# Patient Record
Sex: FEMALE | Race: OTHER | HISPANIC OR LATINO | URBAN - METROPOLITAN AREA
[De-identification: names, ages, dates, MRNs, and addresses within clinical notes are randomized per-mention and may not be internally consistent; named-entity substitution may affect disease eponyms.]

---

## 2019-06-17 ENCOUNTER — EMERGENCY (EMERGENCY)
Facility: HOSPITAL | Age: 29
LOS: 1 days | Discharge: HOME | End: 2019-06-17
Attending: EMERGENCY MEDICINE | Admitting: EMERGENCY MEDICINE
Payer: COMMERCIAL

## 2019-06-17 VITALS
SYSTOLIC BLOOD PRESSURE: 107 MMHG | RESPIRATION RATE: 16 BRPM | TEMPERATURE: 101 F | OXYGEN SATURATION: 99 % | DIASTOLIC BLOOD PRESSURE: 60 MMHG | HEART RATE: 67 BPM

## 2019-06-17 VITALS
OXYGEN SATURATION: 98 % | SYSTOLIC BLOOD PRESSURE: 129 MMHG | RESPIRATION RATE: 18 BRPM | DIASTOLIC BLOOD PRESSURE: 69 MMHG | TEMPERATURE: 98 F | HEART RATE: 100 BPM

## 2019-06-17 DIAGNOSIS — K62.5 HEMORRHAGE OF ANUS AND RECTUM: ICD-10-CM

## 2019-06-17 DIAGNOSIS — Z91.013 ALLERGY TO SEAFOOD: ICD-10-CM

## 2019-06-17 LAB
ALBUMIN SERPL ELPH-MCNC: 3.9 G/DL — SIGNIFICANT CHANGE UP (ref 3.3–5)
ALP SERPL-CCNC: 63 U/L — SIGNIFICANT CHANGE UP (ref 40–120)
ALT FLD-CCNC: 19 U/L — SIGNIFICANT CHANGE UP (ref 10–45)
ANION GAP SERPL CALC-SCNC: 12 MMOL/L — SIGNIFICANT CHANGE UP (ref 5–17)
APPEARANCE UR: CLEAR — SIGNIFICANT CHANGE UP
APTT BLD: 27.3 SEC — LOW (ref 27.5–36.3)
AST SERPL-CCNC: 20 U/L — SIGNIFICANT CHANGE UP (ref 10–40)
BASOPHILS # BLD AUTO: 0.12 K/UL — SIGNIFICANT CHANGE UP (ref 0–0.2)
BASOPHILS NFR BLD AUTO: 0.9 % — SIGNIFICANT CHANGE UP (ref 0–2)
BILIRUB SERPL-MCNC: 0.3 MG/DL — SIGNIFICANT CHANGE UP (ref 0.2–1.2)
BILIRUB UR-MCNC: NEGATIVE — SIGNIFICANT CHANGE UP
BUN SERPL-MCNC: 10 MG/DL — SIGNIFICANT CHANGE UP (ref 7–23)
CALCIUM SERPL-MCNC: 8.7 MG/DL — SIGNIFICANT CHANGE UP (ref 8.4–10.5)
CHLORIDE SERPL-SCNC: 102 MMOL/L — SIGNIFICANT CHANGE UP (ref 96–108)
CO2 SERPL-SCNC: 23 MMOL/L — SIGNIFICANT CHANGE UP (ref 22–31)
COLOR SPEC: YELLOW — SIGNIFICANT CHANGE UP
CREAT SERPL-MCNC: 0.78 MG/DL — SIGNIFICANT CHANGE UP (ref 0.5–1.3)
CULTURE RESULTS: SIGNIFICANT CHANGE UP
DIFF PNL FLD: NEGATIVE — SIGNIFICANT CHANGE UP
EOSINOPHIL # BLD AUTO: 0 K/UL — SIGNIFICANT CHANGE UP (ref 0–0.5)
EOSINOPHIL NFR BLD AUTO: 0 % — SIGNIFICANT CHANGE UP (ref 0–6)
GLUCOSE SERPL-MCNC: 119 MG/DL — HIGH (ref 70–99)
GLUCOSE UR QL: NEGATIVE — SIGNIFICANT CHANGE UP
HCT VFR BLD CALC: 39.3 % — SIGNIFICANT CHANGE UP (ref 34.5–45)
HGB BLD-MCNC: 12.8 G/DL — SIGNIFICANT CHANGE UP (ref 11.5–15.5)
INR BLD: 1.16 — SIGNIFICANT CHANGE UP (ref 0.88–1.16)
KETONES UR-MCNC: ABNORMAL MG/DL
LACTATE SERPL-SCNC: 1.1 MMOL/L — SIGNIFICANT CHANGE UP (ref 0.5–2)
LEUKOCYTE ESTERASE UR-ACNC: NEGATIVE — SIGNIFICANT CHANGE UP
LIDOCAIN IGE QN: 15 U/L — SIGNIFICANT CHANGE UP (ref 7–60)
LYMPHOCYTES # BLD AUTO: 0.81 K/UL — LOW (ref 1–3.3)
LYMPHOCYTES # BLD AUTO: 6.1 % — LOW (ref 13–44)
MCHC RBC-ENTMCNC: 28.9 PG — SIGNIFICANT CHANGE UP (ref 27–34)
MCHC RBC-ENTMCNC: 32.6 GM/DL — SIGNIFICANT CHANGE UP (ref 32–36)
MCV RBC AUTO: 88.7 FL — SIGNIFICANT CHANGE UP (ref 80–100)
MONOCYTES # BLD AUTO: 0.47 K/UL — SIGNIFICANT CHANGE UP (ref 0–0.9)
MONOCYTES NFR BLD AUTO: 3.5 % — SIGNIFICANT CHANGE UP (ref 2–14)
NEUTROPHILS # BLD AUTO: 11.92 K/UL — HIGH (ref 1.8–7.4)
NEUTROPHILS NFR BLD AUTO: 80.8 % — HIGH (ref 43–77)
NITRITE UR-MCNC: NEGATIVE — SIGNIFICANT CHANGE UP
PH UR: 6 — SIGNIFICANT CHANGE UP (ref 5–8)
PLATELET # BLD AUTO: 217 K/UL — SIGNIFICANT CHANGE UP (ref 150–400)
POTASSIUM SERPL-MCNC: 3.6 MMOL/L — SIGNIFICANT CHANGE UP (ref 3.5–5.3)
POTASSIUM SERPL-SCNC: 3.6 MMOL/L — SIGNIFICANT CHANGE UP (ref 3.5–5.3)
PROT SERPL-MCNC: 7.1 G/DL — SIGNIFICANT CHANGE UP (ref 6–8.3)
PROT UR-MCNC: NEGATIVE MG/DL — SIGNIFICANT CHANGE UP
PROTHROM AB SERPL-ACNC: 13.2 SEC — HIGH (ref 10–12.9)
RBC # BLD: 4.43 M/UL — SIGNIFICANT CHANGE UP (ref 3.8–5.2)
RBC # FLD: 11.6 % — SIGNIFICANT CHANGE UP (ref 10.3–14.5)
SODIUM SERPL-SCNC: 137 MMOL/L — SIGNIFICANT CHANGE UP (ref 135–145)
SP GR SPEC: <=1.005 — SIGNIFICANT CHANGE UP (ref 1–1.03)
SPECIMEN SOURCE: SIGNIFICANT CHANGE UP
UROBILINOGEN FLD QL: 0.2 E.U./DL — SIGNIFICANT CHANGE UP
WBC # BLD: 13.32 K/UL — HIGH (ref 3.8–10.5)
WBC # FLD AUTO: 13.32 K/UL — HIGH (ref 3.8–10.5)

## 2019-06-17 PROCEDURE — 74177 CT ABD & PELVIS W/CONTRAST: CPT | Mod: 26

## 2019-06-17 PROCEDURE — 74177 CT ABD & PELVIS W/CONTRAST: CPT

## 2019-06-17 PROCEDURE — 93010 ELECTROCARDIOGRAM REPORT: CPT

## 2019-06-17 PROCEDURE — 87507 IADNA-DNA/RNA PROBE TQ 12-25: CPT

## 2019-06-17 PROCEDURE — 85610 PROTHROMBIN TIME: CPT

## 2019-06-17 PROCEDURE — 81003 URINALYSIS AUTO W/O SCOPE: CPT

## 2019-06-17 PROCEDURE — 99285 EMERGENCY DEPT VISIT HI MDM: CPT | Mod: 25

## 2019-06-17 PROCEDURE — 99284 EMERGENCY DEPT VISIT MOD MDM: CPT | Mod: 25

## 2019-06-17 PROCEDURE — 96375 TX/PRO/DX INJ NEW DRUG ADDON: CPT

## 2019-06-17 PROCEDURE — 93005 ELECTROCARDIOGRAM TRACING: CPT

## 2019-06-17 PROCEDURE — 80053 COMPREHEN METABOLIC PANEL: CPT

## 2019-06-17 PROCEDURE — 85025 COMPLETE CBC W/AUTO DIFF WBC: CPT

## 2019-06-17 PROCEDURE — 36415 COLL VENOUS BLD VENIPUNCTURE: CPT

## 2019-06-17 PROCEDURE — 83605 ASSAY OF LACTIC ACID: CPT

## 2019-06-17 PROCEDURE — 83690 ASSAY OF LIPASE: CPT

## 2019-06-17 PROCEDURE — 96374 THER/PROPH/DIAG INJ IV PUSH: CPT | Mod: XU

## 2019-06-17 PROCEDURE — 85730 THROMBOPLASTIN TIME PARTIAL: CPT

## 2019-06-17 RX ORDER — MORPHINE SULFATE 50 MG/1
2 CAPSULE, EXTENDED RELEASE ORAL ONCE
Refills: 0 | Status: DISCONTINUED | OUTPATIENT
Start: 2019-06-17 | End: 2019-06-17

## 2019-06-17 RX ORDER — SODIUM CHLORIDE 9 MG/ML
1000 INJECTION INTRAMUSCULAR; INTRAVENOUS; SUBCUTANEOUS ONCE
Refills: 0 | Status: COMPLETED | OUTPATIENT
Start: 2019-06-17 | End: 2019-06-17

## 2019-06-17 RX ORDER — AZITHROMYCIN 500 MG/1
1 TABLET, FILM COATED ORAL
Qty: 2 | Refills: 0
Start: 2019-06-17 | End: 2019-06-18

## 2019-06-17 RX ORDER — ONDANSETRON 8 MG/1
4 TABLET, FILM COATED ORAL ONCE
Refills: 0 | Status: COMPLETED | OUTPATIENT
Start: 2019-06-17 | End: 2019-06-17

## 2019-06-17 RX ORDER — ACETAMINOPHEN 500 MG
1000 TABLET ORAL ONCE
Refills: 0 | Status: COMPLETED | OUTPATIENT
Start: 2019-06-17 | End: 2019-06-17

## 2019-06-17 RX ORDER — AZITHROMYCIN 500 MG/1
500 TABLET, FILM COATED ORAL ONCE
Refills: 0 | Status: COMPLETED | OUTPATIENT
Start: 2019-06-17 | End: 2019-06-17

## 2019-06-17 RX ORDER — IOHEXOL 300 MG/ML
30 INJECTION, SOLUTION INTRAVENOUS ONCE
Refills: 0 | Status: COMPLETED | OUTPATIENT
Start: 2019-06-17 | End: 2019-06-17

## 2019-06-17 RX ADMIN — AZITHROMYCIN 500 MILLIGRAM(S): 500 TABLET, FILM COATED ORAL at 06:21

## 2019-06-17 RX ADMIN — Medication 1000 MILLIGRAM(S): at 06:21

## 2019-06-17 RX ADMIN — IOHEXOL 30 MILLILITER(S): 300 INJECTION, SOLUTION INTRAVENOUS at 01:56

## 2019-06-17 RX ADMIN — ONDANSETRON 4 MILLIGRAM(S): 8 TABLET, FILM COATED ORAL at 01:56

## 2019-06-17 RX ADMIN — MORPHINE SULFATE 2 MILLIGRAM(S): 50 CAPSULE, EXTENDED RELEASE ORAL at 01:56

## 2019-06-17 RX ADMIN — SODIUM CHLORIDE 1000 MILLILITER(S): 9 INJECTION INTRAMUSCULAR; INTRAVENOUS; SUBCUTANEOUS at 01:56

## 2019-06-17 NOTE — ED PROVIDER NOTE - PHYSICAL EXAMINATION
VITAL SIGNS: I have reviewed nursing notes and confirm.  CONSTITUTIONAL: Well-developed; well-nourished; in no acute distress.  SKIN: warm and dry, no acute rash.  HEAD: Normocephalic; atraumatic.  EYES: PERRL, EOM intact; conjunctiva and sclera clear.  ENT: No nasal discharge; airway clear.  NECK: Supple; non tender.  CARD: S1, S2 normal; no murmurs, gallops, or rubs. Regular rate and rhythm.  RESP: No wheezes, rales or rhonchi.  ABD: Normal bowel sounds; soft; non-distended; b/l lower abd TTP w/out rigidity/rebound/guarding  : No CVA TTP b/l  RECTAL: No stool in vault, no hemorrhoids, + small amount blood tinged mucus in vault  EXT: Normal ROM. No clubbing, cyanosis or edema.  NEURO: Alert, oriented. Grossly unremarkable.  PSYCH: Cooperative, appropriate.

## 2019-06-17 NOTE — ED PROVIDER NOTE - CLINICAL SUMMARY MEDICAL DECISION MAKING FREE TEXT BOX
Syncope w/recent heavy vaginal bleeding (miscarriage) and now BRBPR -- possible symptomatic anemia, r/o cardiac etiology. Given abd TTP and n/v/d, will obtain CT of abd/pel, pain control, supportive care, and reassess.

## 2019-06-17 NOTE — ED PROVIDER NOTE - OBJECTIVE STATEMENT
29 y/o F w/no sig pmhx, had miscarriage 10 days ago (was approx 2-3wks pregnant at the time) w/resolution of vaginal bleeding, developed nausea and crampy abd pain today, vomited several times nbnb emesis, had an episode of watery diarrhea that then converted into bloody diarrhea w/bright red blood. Also in the afternoon pt was walking in her apt, became lightheaded and collapsed against the wall, having first a near syncopal episode then full syncopal episode minutes later confirmed by boyfriend who was on scene caring for pt. No loss of bowel/bladder function, rhythmic jerking, or tongue biting. Pt woke up several seconds later w/no confusion. No fever/chills. No new or suspicious foods. No hx of GI bleed, hemorrhoids, or syncope. No SOB, CP, or palpitations.

## 2019-06-17 NOTE — ED ADULT NURSE NOTE - OBJECTIVE STATEMENT
pt. presents with c/o fever, n/v, abdominal cramping and bloody diarrhea, pt. also c/o of a near syncopal and syncopal episode after other symptoms started. pt. reports hx of miscarriage 10 days ago, no vaginal bleed at present time. pt. is A&Ox3, communicates w/o difficulty, skin warm dry, no c/o chest pain, palpitations, dizziness.

## 2019-12-10 ENCOUNTER — APPOINTMENT (OUTPATIENT)
Dept: OBGYN | Facility: CLINIC | Age: 29
End: 2019-12-10

## 2020-01-03 ENCOUNTER — EMERGENCY (EMERGENCY)
Facility: HOSPITAL | Age: 30
LOS: 1 days | Discharge: HOME | End: 2020-01-03
Attending: EMERGENCY MEDICINE | Admitting: EMERGENCY MEDICINE
Payer: COMMERCIAL

## 2020-01-03 VITALS
TEMPERATURE: 100 F | OXYGEN SATURATION: 98 % | HEART RATE: 104 BPM | DIASTOLIC BLOOD PRESSURE: 69 MMHG | RESPIRATION RATE: 18 BRPM | SYSTOLIC BLOOD PRESSURE: 100 MMHG | HEIGHT: 61 IN | WEIGHT: 141.1 LBS

## 2020-01-03 VITALS
OXYGEN SATURATION: 100 % | RESPIRATION RATE: 18 BRPM | HEART RATE: 96 BPM | TEMPERATURE: 99 F | DIASTOLIC BLOOD PRESSURE: 75 MMHG | SYSTOLIC BLOOD PRESSURE: 113 MMHG

## 2020-01-03 LAB
ALBUMIN SERPL ELPH-MCNC: 4.2 G/DL — SIGNIFICANT CHANGE UP (ref 3.3–5)
ALP SERPL-CCNC: 53 U/L — SIGNIFICANT CHANGE UP (ref 40–120)
ALT FLD-CCNC: 16 U/L — SIGNIFICANT CHANGE UP (ref 10–45)
ANION GAP SERPL CALC-SCNC: 15 MMOL/L — SIGNIFICANT CHANGE UP (ref 5–17)
APPEARANCE UR: CLEAR — SIGNIFICANT CHANGE UP
AST SERPL-CCNC: 17 U/L — SIGNIFICANT CHANGE UP (ref 10–40)
BASOPHILS # BLD AUTO: 0.06 K/UL — SIGNIFICANT CHANGE UP (ref 0–0.2)
BASOPHILS NFR BLD AUTO: 0.9 % — SIGNIFICANT CHANGE UP (ref 0–2)
BILIRUB SERPL-MCNC: 0.3 MG/DL — SIGNIFICANT CHANGE UP (ref 0.2–1.2)
BILIRUB UR-MCNC: NEGATIVE — SIGNIFICANT CHANGE UP
BUN SERPL-MCNC: 7 MG/DL — SIGNIFICANT CHANGE UP (ref 7–23)
CALCIUM SERPL-MCNC: 9.3 MG/DL — SIGNIFICANT CHANGE UP (ref 8.4–10.5)
CHLORIDE SERPL-SCNC: 101 MMOL/L — SIGNIFICANT CHANGE UP (ref 96–108)
CO2 SERPL-SCNC: 19 MMOL/L — LOW (ref 22–31)
COLOR SPEC: YELLOW — SIGNIFICANT CHANGE UP
CREAT SERPL-MCNC: 0.5 MG/DL — SIGNIFICANT CHANGE UP (ref 0.5–1.3)
DIFF PNL FLD: NEGATIVE — SIGNIFICANT CHANGE UP
EOSINOPHIL # BLD AUTO: 0.18 K/UL — SIGNIFICANT CHANGE UP (ref 0–0.5)
EOSINOPHIL NFR BLD AUTO: 2.6 % — SIGNIFICANT CHANGE UP (ref 0–6)
FLU A RESULT: SIGNIFICANT CHANGE UP
FLU A RESULT: SIGNIFICANT CHANGE UP
FLUAV AG NPH QL: SIGNIFICANT CHANGE UP
FLUBV AG NPH QL: SIGNIFICANT CHANGE UP
GLUCOSE SERPL-MCNC: 91 MG/DL — SIGNIFICANT CHANGE UP (ref 70–99)
GLUCOSE UR QL: NEGATIVE — SIGNIFICANT CHANGE UP
HCG SERPL-ACNC: HIGH MIU/ML
HCT VFR BLD CALC: 38.7 % — SIGNIFICANT CHANGE UP (ref 34.5–45)
HGB BLD-MCNC: 13.1 G/DL — SIGNIFICANT CHANGE UP (ref 11.5–15.5)
KETONES UR-MCNC: >=80 MG/DL
LEUKOCYTE ESTERASE UR-ACNC: ABNORMAL
LYMPHOCYTES # BLD AUTO: 0.3 K/UL — LOW (ref 1–3.3)
LYMPHOCYTES # BLD AUTO: 4.4 % — LOW (ref 13–44)
MCHC RBC-ENTMCNC: 29.5 PG — SIGNIFICANT CHANGE UP (ref 27–34)
MCHC RBC-ENTMCNC: 33.9 GM/DL — SIGNIFICANT CHANGE UP (ref 32–36)
MCV RBC AUTO: 87.2 FL — SIGNIFICANT CHANGE UP (ref 80–100)
MONOCYTES # BLD AUTO: 0.18 K/UL — SIGNIFICANT CHANGE UP (ref 0–0.9)
MONOCYTES NFR BLD AUTO: 2.6 % — SIGNIFICANT CHANGE UP (ref 2–14)
NEUTROPHILS # BLD AUTO: 6.18 K/UL — SIGNIFICANT CHANGE UP (ref 1.8–7.4)
NEUTROPHILS NFR BLD AUTO: 86.9 % — HIGH (ref 43–77)
NITRITE UR-MCNC: NEGATIVE — SIGNIFICANT CHANGE UP
PH UR: 6.5 — SIGNIFICANT CHANGE UP (ref 5–8)
PLATELET # BLD AUTO: 197 K/UL — SIGNIFICANT CHANGE UP (ref 150–400)
POTASSIUM SERPL-MCNC: 3.7 MMOL/L — SIGNIFICANT CHANGE UP (ref 3.5–5.3)
POTASSIUM SERPL-SCNC: 3.7 MMOL/L — SIGNIFICANT CHANGE UP (ref 3.5–5.3)
PROT SERPL-MCNC: 7.3 G/DL — SIGNIFICANT CHANGE UP (ref 6–8.3)
PROT UR-MCNC: ABNORMAL MG/DL
RBC # BLD: 4.44 M/UL — SIGNIFICANT CHANGE UP (ref 3.8–5.2)
RBC # FLD: 11.9 % — SIGNIFICANT CHANGE UP (ref 10.3–14.5)
RSV RESULT: SIGNIFICANT CHANGE UP
RSV RNA RESP QL NAA+PROBE: SIGNIFICANT CHANGE UP
SODIUM SERPL-SCNC: 135 MMOL/L — SIGNIFICANT CHANGE UP (ref 135–145)
SP GR SPEC: 1.02 — SIGNIFICANT CHANGE UP (ref 1–1.03)
UROBILINOGEN FLD QL: 0.2 E.U./DL — SIGNIFICANT CHANGE UP
WBC # BLD: 6.91 K/UL — SIGNIFICANT CHANGE UP (ref 3.8–10.5)
WBC # FLD AUTO: 6.91 K/UL — SIGNIFICANT CHANGE UP (ref 3.8–10.5)

## 2020-01-03 PROCEDURE — 76801 OB US < 14 WKS SINGLE FETUS: CPT

## 2020-01-03 PROCEDURE — 81001 URINALYSIS AUTO W/SCOPE: CPT

## 2020-01-03 PROCEDURE — 76817 TRANSVAGINAL US OBSTETRIC: CPT | Mod: 26

## 2020-01-03 PROCEDURE — 99284 EMERGENCY DEPT VISIT MOD MDM: CPT

## 2020-01-03 PROCEDURE — 76817 TRANSVAGINAL US OBSTETRIC: CPT

## 2020-01-03 PROCEDURE — 96374 THER/PROPH/DIAG INJ IV PUSH: CPT

## 2020-01-03 PROCEDURE — 80053 COMPREHEN METABOLIC PANEL: CPT

## 2020-01-03 PROCEDURE — 84702 CHORIONIC GONADOTROPIN TEST: CPT

## 2020-01-03 PROCEDURE — 99284 EMERGENCY DEPT VISIT MOD MDM: CPT | Mod: 25

## 2020-01-03 PROCEDURE — 87086 URINE CULTURE/COLONY COUNT: CPT

## 2020-01-03 PROCEDURE — 76801 OB US < 14 WKS SINGLE FETUS: CPT | Mod: 26

## 2020-01-03 PROCEDURE — 85025 COMPLETE CBC W/AUTO DIFF WBC: CPT

## 2020-01-03 PROCEDURE — 87631 RESP VIRUS 3-5 TARGETS: CPT

## 2020-01-03 PROCEDURE — 36415 COLL VENOUS BLD VENIPUNCTURE: CPT

## 2020-01-03 RX ORDER — SODIUM CHLORIDE 9 MG/ML
1000 INJECTION INTRAMUSCULAR; INTRAVENOUS; SUBCUTANEOUS ONCE
Refills: 0 | Status: COMPLETED | OUTPATIENT
Start: 2020-01-03 | End: 2020-01-03

## 2020-01-03 RX ORDER — METOCLOPRAMIDE HCL 10 MG
10 TABLET ORAL ONCE
Refills: 0 | Status: COMPLETED | OUTPATIENT
Start: 2020-01-03 | End: 2020-01-03

## 2020-01-03 RX ORDER — ACETAMINOPHEN 500 MG
650 TABLET ORAL ONCE
Refills: 0 | Status: COMPLETED | OUTPATIENT
Start: 2020-01-03 | End: 2020-01-03

## 2020-01-03 RX ADMIN — Medication 650 MILLIGRAM(S): at 13:05

## 2020-01-03 RX ADMIN — Medication 104 MILLIGRAM(S): at 13:04

## 2020-01-03 RX ADMIN — SODIUM CHLORIDE 1000 MILLILITER(S): 9 INJECTION INTRAMUSCULAR; INTRAVENOUS; SUBCUTANEOUS at 13:04

## 2020-01-03 NOTE — ED PROVIDER NOTE - PHYSICAL EXAMINATION
CONSTITUTIONAL: Well-appearing; well-nourished; in no apparent distress.   HEAD: Normocephalic; atraumatic.   EYES: PERRL; EOM intact; conjunctiva and sclera clear  ENT: normal nose; no rhinorrhea; normal pharynx with no erythema or lesions.   NECK: Supple; non-tender; no LAD  CARDIOVASCULAR: Normal S1, S2; no murmurs, rubs, or gallops. Regular rate and rhythm.   RESPIRATORY: Breathing easily; breath sounds clear and equal bilaterally; no wheezes, rhonchi, or rales.  GI: Mild suprapubic tenderness to palpation. Abdomen is otherwise soft; non-distended; no palpable organomegaly.   : No CMT or adnexal tenderness  MSK: FROM at all extremities, normal tone   EXT: No cyanosis or edema; N/V intact  SKIN: Normal for age and race; warm; dry; good turgor; no apparent lesions or rash.   NEURO: A & O x 3; face symmetric; grossly unremarkable.   PSYCHOLOGICAL: The patient’s mood and manner are appropriate.

## 2020-01-03 NOTE — ED PROVIDER NOTE - ATTENDING CONTRIBUTION TO CARE
30 yo F  at 13 weeks p/w fever,  nausea vomiting, generalized ab pain and cough x 3 days.  No diarrhea.  Pt Tm 100.2 at home.  No chest pain sob, vaginal bleeding, fluid loss.  99.6 oral HR 90s.  Belly soft nontender, lungs clear nl heent no meningeal signs.  Labs and flu wnl.  Pt hydrated and tx and improved.  Likely viral.  Do not suspect pna, sepsis or  labor. Plan outpt fu with PCP and GYN.

## 2020-01-03 NOTE — ED PROVIDER NOTE - DISPOSITION TYPE
[place] [facility]  [address] [address]  [phone]      August 13, 2018    RE:  Josefina Aldrich                                                                                                                                                       [id number]  [facility] [address]            To whom it may concern:    Josefina Aldrich is under my professional care for Medical.  She  may return to work with the following: No working or lifting restrictions on or about 8-15-18.          Sincerely,        Onur Serrano    [place] [facility]        
DISCHARGE

## 2020-01-03 NOTE — ED PROVIDER NOTE - PATIENT PORTAL LINK FT
You can access the FollowMyHealth Patient Portal offered by [facility] by registering at the following website: [email or web address]. By joining [employer]’s FollowMyHealth portal, you will also be able to view your health information using other applications (apps) compatible with our system.

## 2020-01-03 NOTE — ED ADULT NURSE NOTE - OBJECTIVE STATEMENT
Patient is a 28yo F, arrived to ED via walk-in, AAOx3, in NAD, VSS, complaining of abdominal pain, nausea and vomiting.  Patient also complaining of cough for three days.  Patient denies any fevers, vomiting, vaginal bleeding or any other complaints at this time.

## 2020-01-03 NOTE — ED PROVIDER NOTE - CLINICAL SUMMARY MEDICAL DECISION MAKING FREE TEXT BOX
28 y/o F  at 13 weeks gestation presents with generalized abdominal pain, nausea, and vomiting since this morning associated with low grade fever, cough and rhinorrhea; no body aches or vaginal bleeding. In the ED, temp 99.6F, . Symptoms likely from viral illness. Will check labs, ultrasound to check baby, and flu swab.

## 2020-01-03 NOTE — ED ADULT TRIAGE NOTE - OTHER COMPLAINTS
, 13 weeks pregnant, c/o nausea/vomiting, mid abdominal pain. denies vaginal bleeding. also reports cough and low grade temp 100.2F today.

## 2020-01-03 NOTE — ED ADULT NURSE NOTE - NSIMPLEMENTINTERV_GEN_ALL_ED
Implemented All Universal Safety Interventions:  [place] to call system. Call bell, personal items and telephone within reach. Instruct patient to call for assistance. Room bathroom lighting operational. Non-slip footwear when patient is off stretcher. Physically safe environment: no spills, clutter or unnecessary equipment. Stretcher in lowest position, wheels locked, appropriate side rails in place.

## 2020-01-03 NOTE — ED PROVIDER NOTE - CARE PROVIDER_API CALL
Jamel Rodriguez)  Obstetrics and Gynecology  [address] [place], [place] [address]  Phone: [phone]  Fax: [phone]  Follow Up Time:

## 2020-01-03 NOTE — ED PROVIDER NOTE - OBJECTIVE STATEMENT
28 y/o F, , currently 13 weeks pregnant, with no PMHx presents to the ED with generalized abdominal pain, nausea and vomiting since 6am this morning with headache, fever, cough and rhinorrhea. Denies pelvic pain, diarrhea, dysuria, hematuria, vaginal bleeding/discharge, myalgias, or sick contact. Pt has received this season's flu vaccine. Follows with Dr. Rodriguez (OB/GYN)

## 2020-01-04 LAB
CULTURE RESULTS: NO GROWTH — SIGNIFICANT CHANGE UP
SPECIMEN SOURCE: SIGNIFICANT CHANGE UP

## 2020-01-08 DIAGNOSIS — B34.9 VIRAL INFECTION, UNSPECIFIED: ICD-10-CM

## 2020-01-08 DIAGNOSIS — O26.891 OTHER SPECIFIED PREGNANCY RELATED CONDITIONS, FIRST TRIMESTER: ICD-10-CM

## 2020-01-08 DIAGNOSIS — Z3A.13 13 WEEKS GESTATION OF PREGNANCY: ICD-10-CM

## 2020-01-08 DIAGNOSIS — R10.84 GENERALIZED ABDOMINAL PAIN: ICD-10-CM

## 2020-03-03 PROBLEM — Z78.9 OTHER SPECIFIED HEALTH STATUS: Chronic | Status: ACTIVE | Noted: 2020-01-03

## 2020-03-13 ENCOUNTER — NON-APPOINTMENT (OUTPATIENT)
Age: 30
End: 2020-03-13

## 2020-03-13 ENCOUNTER — APPOINTMENT (OUTPATIENT)
Dept: OBGYN | Facility: CLINIC | Age: 30
End: 2020-03-13
Payer: COMMERCIAL

## 2020-03-13 VITALS — DIASTOLIC BLOOD PRESSURE: 60 MMHG | SYSTOLIC BLOOD PRESSURE: 100 MMHG | WEIGHT: 149.38 LBS

## 2020-03-13 PROCEDURE — 36415 COLL VENOUS BLD VENIPUNCTURE: CPT

## 2020-03-13 PROCEDURE — 99202 OFFICE O/P NEW SF 15 MIN: CPT

## 2020-03-15 LAB
ABO + RH PNL BLD: NORMAL
ALBUMIN SERPL ELPH-MCNC: 3.8 G/DL
ALP BLD-CCNC: 60 U/L
ALT SERPL-CCNC: 16 U/L
ANION GAP SERPL CALC-SCNC: 14 MMOL/L
AST SERPL-CCNC: 18 U/L
BASOPHILS # BLD AUTO: 0.03 K/UL
BASOPHILS NFR BLD AUTO: 0.3 %
BILIRUB SERPL-MCNC: 0.2 MG/DL
BLD GP AB SCN SERPL QL: NORMAL
BUN SERPL-MCNC: 10 MG/DL
CALCIUM SERPL-MCNC: 8.9 MG/DL
CHLORIDE SERPL-SCNC: 101 MMOL/L
CMV IGG SERPL QL: <0.2 U/ML
CMV IGG SERPL-IMP: NEGATIVE
CMV IGM SERPL QL: <8 AU/ML
CMV IGM SERPL QL: NEGATIVE
CO2 SERPL-SCNC: 21 MMOL/L
CREAT SERPL-MCNC: 0.59 MG/DL
EOSINOPHIL # BLD AUTO: 0.24 K/UL
EOSINOPHIL NFR BLD AUTO: 2.7 %
ESTIMATED AVERAGE GLUCOSE: 88 MG/DL
GLUCOSE SERPL-MCNC: 96 MG/DL
HBA1C MFR BLD HPLC: 4.7 %
HBV SURFACE AG SER QL: NONREACTIVE
HCT VFR BLD CALC: 35.1 %
HCV AB SER QL: NONREACTIVE
HCV S/CO RATIO: 0.29 S/CO
HGB BLD-MCNC: 11.1 G/DL
HIV1+2 AB SPEC QL IA.RAPID: NONREACTIVE
HSV 1+2 IGG SER IA-IMP: NEGATIVE
HSV 1+2 IGG SER IA-IMP: POSITIVE
HSV1 IGG SER QL: 8.99 INDEX
HSV2 IGG SER QL: 0.16 INDEX
IMM GRANULOCYTES NFR BLD AUTO: 0.3 %
LYMPHOCYTES # BLD AUTO: 1.17 K/UL
LYMPHOCYTES NFR BLD AUTO: 13.2 %
MAN DIFF?: NORMAL
MCHC RBC-ENTMCNC: 29.8 PG
MCHC RBC-ENTMCNC: 31.6 GM/DL
MCV RBC AUTO: 94.4 FL
MEV IGG FLD QL IA: 16.9 AU/ML
MEV IGG+IGM SER-IMP: POSITIVE
MONOCYTES # BLD AUTO: 0.38 K/UL
MONOCYTES NFR BLD AUTO: 4.3 %
NEUTROPHILS # BLD AUTO: 6.99 K/UL
NEUTROPHILS NFR BLD AUTO: 79.2 %
PLATELET # BLD AUTO: 214 K/UL
POTASSIUM SERPL-SCNC: 4.2 MMOL/L
PROT SERPL-MCNC: 6.2 G/DL
RBC # BLD: 3.72 M/UL
RBC # FLD: 13 %
RUBV IGG FLD-ACNC: 10.6 INDEX
RUBV IGG SER-IMP: POSITIVE
SODIUM SERPL-SCNC: 136 MMOL/L
T GONDII AB SER-IMP: NEGATIVE
T GONDII AB SER-IMP: NEGATIVE
T GONDII IGG SER QL: <3 IU/ML
T GONDII IGM SER QL: <3 AU/ML
T PALLIDUM AB SER QL IA: NEGATIVE
TSH SERPL-ACNC: 0.67 UIU/ML
VZV AB TITR SER: POSITIVE
VZV IGG SER IF-ACNC: 1186 INDEX
WBC # FLD AUTO: 8.84 K/UL

## 2020-03-15 NOTE — CHIEF COMPLAINT
[Initial Prenatal Visit] : initial prenatal visit [FreeTextEntry1] : Initial prenatal visit.\par LMP:10/3/2019\par BROOK:7/9/2020\par [id number]

## 2020-03-15 NOTE — HISTORY OF PRESENT ILLNESS
[Definite:  ___ (Date)] : the last menstrual period was [unfilled] [Normal Amount/Duration] : was of a normal amount and duration [Regular Cycle Intervals] : periods have been regular [Frequency: Q ___ days] : menstrual periods occur approximately every [unfilled] days [Menarche Age: ____] : age at menarche was [unfilled] [Spotting Between  Menses] : no spotting between menses [Menstrual Cramps] : no menstrual cramps [On BCP at conception] : the patient was not on BCP at conception [Contraception] : does not use contraception

## 2020-03-17 LAB
B19V IGG SER QL IA: 6.2 INDEX
B19V IGG+IGM SER-IMP: NORMAL
B19V IGG+IGM SER-IMP: POSITIVE
B19V IGM FLD-ACNC: 0.1
B19V IGM SER-ACNC: NEGATIVE
HGB A MFR BLD: 96.9 %
HGB A2 MFR BLD: 3.1 %
HGB FRACT BLD-IMP: NORMAL
HSV1 IGM SER QL: NORMAL TITER
HSV2 AB FLD-ACNC: NORMAL TITER

## 2020-03-18 ENCOUNTER — APPOINTMENT (OUTPATIENT)
Dept: ANTEPARTUM | Facility: CLINIC | Age: 30
End: 2020-03-18
Payer: COMMERCIAL

## 2020-03-18 PROCEDURE — 76816 OB US FOLLOW-UP PER FETUS: CPT

## 2020-03-18 PROCEDURE — 76819 FETAL BIOPHYS PROFIL W/O NST: CPT

## 2020-03-20 LAB — FMR1 GENE MUT ANL BLD/T: NORMAL

## 2020-03-27 LAB
AR GENE MUT ANL BLD/T: NEGATIVE
CFTR MUT TESTED BLD/T: NEGATIVE

## 2020-04-02 ENCOUNTER — NON-APPOINTMENT (OUTPATIENT)
Age: 30
End: 2020-04-02

## 2020-04-02 ENCOUNTER — APPOINTMENT (OUTPATIENT)
Dept: OBGYN | Facility: CLINIC | Age: 30
End: 2020-04-02
Payer: COMMERCIAL

## 2020-04-02 VITALS — SYSTOLIC BLOOD PRESSURE: 100 MMHG | WEIGHT: 149 LBS | DIASTOLIC BLOOD PRESSURE: 60 MMHG

## 2020-04-02 PROCEDURE — 0502F SUBSEQUENT PRENATAL CARE: CPT

## 2020-04-03 LAB
BASOPHILS # BLD AUTO: 0.06 K/UL
BASOPHILS NFR BLD AUTO: 0.7 %
EOSINOPHIL # BLD AUTO: 0.25 K/UL
EOSINOPHIL NFR BLD AUTO: 3 %
GLUCOSE 1H P 50 G GLC PO SERPL-MCNC: 117 MG/DL
HCT VFR BLD CALC: 37.9 %
HGB BLD-MCNC: 11.2 G/DL
IMM GRANULOCYTES NFR BLD AUTO: 0.4 %
LYMPHOCYTES # BLD AUTO: 1.08 K/UL
LYMPHOCYTES NFR BLD AUTO: 13.1 %
MAN DIFF?: NORMAL
MCHC RBC-ENTMCNC: 29.4 PG
MCHC RBC-ENTMCNC: 29.6 GM/DL
MCV RBC AUTO: 99.5 FL
MONOCYTES # BLD AUTO: 0.41 K/UL
MONOCYTES NFR BLD AUTO: 5 %
NEUTROPHILS # BLD AUTO: 6.44 K/UL
NEUTROPHILS NFR BLD AUTO: 77.8 %
PLATELET # BLD AUTO: 210 K/UL
RBC # BLD: 3.81 M/UL
RBC # FLD: 13.2 %
WBC # FLD AUTO: 8.27 K/UL

## 2020-04-05 LAB — BACTERIA UR CULT: NORMAL

## 2020-04-16 ENCOUNTER — APPOINTMENT (OUTPATIENT)
Dept: OBGYN | Facility: CLINIC | Age: 30
End: 2020-04-16
Payer: COMMERCIAL

## 2020-04-16 ENCOUNTER — NON-APPOINTMENT (OUTPATIENT)
Age: 30
End: 2020-04-16

## 2020-04-16 VITALS — DIASTOLIC BLOOD PRESSURE: 60 MMHG | SYSTOLIC BLOOD PRESSURE: 100 MMHG | WEIGHT: 150 LBS

## 2020-04-16 PROCEDURE — 0502F SUBSEQUENT PRENATAL CARE: CPT

## 2020-04-25 ENCOUNTER — MESSAGE (OUTPATIENT)
Age: 30
End: 2020-04-25

## 2020-04-30 ENCOUNTER — NON-APPOINTMENT (OUTPATIENT)
Age: 30
End: 2020-04-30

## 2020-04-30 ENCOUNTER — APPOINTMENT (OUTPATIENT)
Dept: OBGYN | Facility: CLINIC | Age: 30
End: 2020-04-30
Payer: COMMERCIAL

## 2020-04-30 VITALS — SYSTOLIC BLOOD PRESSURE: 110 MMHG | DIASTOLIC BLOOD PRESSURE: 60 MMHG | WEIGHT: 152 LBS

## 2020-04-30 PROCEDURE — 0502F SUBSEQUENT PRENATAL CARE: CPT

## 2020-05-01 LAB
SARS-COV-2 IGG SERPL IA-ACNC: 0.1 INDEX
SARS-COV-2 IGG SERPL QL IA: NEGATIVE

## 2020-05-14 ENCOUNTER — APPOINTMENT (OUTPATIENT)
Dept: ANTEPARTUM | Facility: CLINIC | Age: 30
End: 2020-05-14
Payer: COMMERCIAL

## 2020-05-14 PROCEDURE — 76816 OB US FOLLOW-UP PER FETUS: CPT

## 2020-05-14 PROCEDURE — 76819 FETAL BIOPHYS PROFIL W/O NST: CPT

## 2020-05-21 ENCOUNTER — NON-APPOINTMENT (OUTPATIENT)
Age: 30
End: 2020-05-21

## 2020-05-21 ENCOUNTER — APPOINTMENT (OUTPATIENT)
Dept: OBGYN | Facility: CLINIC | Age: 30
End: 2020-05-21
Payer: COMMERCIAL

## 2020-05-21 ENCOUNTER — LABORATORY RESULT (OUTPATIENT)
Age: 30
End: 2020-05-21

## 2020-05-21 VITALS — SYSTOLIC BLOOD PRESSURE: 100 MMHG | WEIGHT: 155 LBS | DIASTOLIC BLOOD PRESSURE: 60 MMHG

## 2020-05-21 PROCEDURE — 0502F SUBSEQUENT PRENATAL CARE: CPT

## 2020-05-22 LAB — HIV1+2 AB SPEC QL IA.RAPID: NONREACTIVE

## 2020-05-25 LAB
B19V IGG SER QL IA: 6.9 INDEX
B19V IGG+IGM SER-IMP: NORMAL
B19V IGG+IGM SER-IMP: POSITIVE
B19V IGM FLD-ACNC: 0.1
B19V IGM SER-ACNC: NEGATIVE
CMV IGG SERPL QL: <0.2 U/ML
CMV IGG SERPL-IMP: NEGATIVE
CMV IGM SERPL QL: <8 AU/ML
CMV IGM SERPL QL: NEGATIVE
HBV SURFACE AG SER QL: NONREACTIVE
HCV AB SER QL: NONREACTIVE
HCV S/CO RATIO: 0.32 S/CO
HSV 1+2 IGG SER IA-IMP: NEGATIVE
HSV 1+2 IGG SER IA-IMP: POSITIVE
HSV1 IGG SER QL: 8.79 INDEX
HSV2 IGG SER QL: 0.17 INDEX
MEV IGG FLD QL IA: 17.9 AU/ML
MEV IGG+IGM SER-IMP: POSITIVE
RUBV IGG FLD-ACNC: 11 INDEX
RUBV IGG SER-IMP: POSITIVE
T GONDII AB SER-IMP: NEGATIVE
T GONDII AB SER-IMP: NEGATIVE
T GONDII IGG SER QL: <3 IU/ML
T GONDII IGM SER QL: <3 AU/ML
T PALLIDUM AB SER QL IA: NEGATIVE

## 2020-05-28 ENCOUNTER — APPOINTMENT (OUTPATIENT)
Dept: ANTEPARTUM | Facility: CLINIC | Age: 30
End: 2020-05-28
Payer: COMMERCIAL

## 2020-05-28 LAB
HSV1 IGM SER QL: NORMAL TITER
HSV2 AB FLD-ACNC: NORMAL TITER

## 2020-05-28 PROCEDURE — 76816 OB US FOLLOW-UP PER FETUS: CPT

## 2020-05-28 PROCEDURE — 76819 FETAL BIOPHYS PROFIL W/O NST: CPT

## 2020-06-04 ENCOUNTER — APPOINTMENT (OUTPATIENT)
Dept: OBGYN | Facility: CLINIC | Age: 30
End: 2020-06-04
Payer: COMMERCIAL

## 2020-06-04 ENCOUNTER — LABORATORY RESULT (OUTPATIENT)
Age: 30
End: 2020-06-04

## 2020-06-04 ENCOUNTER — NON-APPOINTMENT (OUTPATIENT)
Age: 30
End: 2020-06-04

## 2020-06-04 VITALS — DIASTOLIC BLOOD PRESSURE: 70 MMHG | SYSTOLIC BLOOD PRESSURE: 100 MMHG | WEIGHT: 157 LBS

## 2020-06-04 PROCEDURE — 0502F SUBSEQUENT PRENATAL CARE: CPT

## 2020-06-12 ENCOUNTER — APPOINTMENT (OUTPATIENT)
Dept: OBGYN | Facility: CLINIC | Age: 30
End: 2020-06-12
Payer: COMMERCIAL

## 2020-06-12 ENCOUNTER — NON-APPOINTMENT (OUTPATIENT)
Age: 30
End: 2020-06-12

## 2020-06-12 ENCOUNTER — APPOINTMENT (OUTPATIENT)
Dept: ANTEPARTUM | Facility: CLINIC | Age: 30
End: 2020-06-12
Payer: COMMERCIAL

## 2020-06-12 VITALS — SYSTOLIC BLOOD PRESSURE: 110 MMHG | DIASTOLIC BLOOD PRESSURE: 70 MMHG | WEIGHT: 156 LBS

## 2020-06-12 PROCEDURE — 0502F SUBSEQUENT PRENATAL CARE: CPT

## 2020-06-12 PROCEDURE — 76819 FETAL BIOPHYS PROFIL W/O NST: CPT

## 2020-06-12 PROCEDURE — 76816 OB US FOLLOW-UP PER FETUS: CPT

## 2020-06-17 ENCOUNTER — APPOINTMENT (OUTPATIENT)
Dept: OBGYN | Facility: CLINIC | Age: 30
End: 2020-06-17
Payer: COMMERCIAL

## 2020-06-17 VITALS — WEIGHT: 158 LBS | SYSTOLIC BLOOD PRESSURE: 110 MMHG | DIASTOLIC BLOOD PRESSURE: 70 MMHG

## 2020-06-17 PROCEDURE — 0502F SUBSEQUENT PRENATAL CARE: CPT

## 2020-06-23 ENCOUNTER — NON-APPOINTMENT (OUTPATIENT)
Age: 30
End: 2020-06-23

## 2020-06-23 ENCOUNTER — APPOINTMENT (OUTPATIENT)
Dept: OBGYN | Facility: CLINIC | Age: 30
End: 2020-06-23
Payer: COMMERCIAL

## 2020-06-23 VITALS — SYSTOLIC BLOOD PRESSURE: 112 MMHG | WEIGHT: 158.31 LBS | DIASTOLIC BLOOD PRESSURE: 70 MMHG

## 2020-06-23 PROCEDURE — 0502F SUBSEQUENT PRENATAL CARE: CPT

## 2020-06-25 ENCOUNTER — APPOINTMENT (OUTPATIENT)
Dept: ANTEPARTUM | Facility: CLINIC | Age: 30
End: 2020-06-25
Payer: COMMERCIAL

## 2020-06-25 PROCEDURE — 76819 FETAL BIOPHYS PROFIL W/O NST: CPT

## 2020-06-25 PROCEDURE — 76816 OB US FOLLOW-UP PER FETUS: CPT

## 2020-07-01 ENCOUNTER — APPOINTMENT (OUTPATIENT)
Dept: OBGYN | Facility: CLINIC | Age: 30
End: 2020-07-01

## 2020-07-01 ENCOUNTER — TRANSCRIPTION ENCOUNTER (OUTPATIENT)
Age: 30
End: 2020-07-01

## 2020-07-01 VITALS — DIASTOLIC BLOOD PRESSURE: 70 MMHG | SYSTOLIC BLOOD PRESSURE: 100 MMHG | WEIGHT: 161.5 LBS

## 2020-07-02 ENCOUNTER — APPOINTMENT (OUTPATIENT)
Dept: OBGYN | Facility: CLINIC | Age: 30
End: 2020-07-02
Payer: COMMERCIAL

## 2020-07-02 ENCOUNTER — APPOINTMENT (OUTPATIENT)
Dept: OBGYN | Facility: CLINIC | Age: 30
End: 2020-07-02

## 2020-07-02 VITALS — DIASTOLIC BLOOD PRESSURE: 60 MMHG | SYSTOLIC BLOOD PRESSURE: 100 MMHG | WEIGHT: 159.63 LBS

## 2020-07-02 PROCEDURE — 0502F SUBSEQUENT PRENATAL CARE: CPT

## 2020-07-05 ENCOUNTER — TRANSCRIPTION ENCOUNTER (OUTPATIENT)
Age: 30
End: 2020-07-05

## 2020-07-06 ENCOUNTER — INPATIENT (INPATIENT)
Facility: HOSPITAL | Age: 30
LOS: 1 days | Discharge: HOME | End: 2020-07-08
Attending: GENERAL ACUTE CARE HOSPITAL | Admitting: GENERAL ACUTE CARE HOSPITAL
Payer: COMMERCIAL

## 2020-07-06 ENCOUNTER — RESULT REVIEW (OUTPATIENT)
Age: 30
End: 2020-07-06

## 2020-07-06 VITALS — HEIGHT: 61 IN | WEIGHT: 159.39 LBS

## 2020-07-06 LAB
BASOPHILS # BLD AUTO: 0.02 K/UL — SIGNIFICANT CHANGE UP (ref 0–0.2)
BASOPHILS NFR BLD AUTO: 0.2 % — SIGNIFICANT CHANGE UP (ref 0–2)
BLD GP AB SCN SERPL QL: NEGATIVE — SIGNIFICANT CHANGE UP
EOSINOPHIL # BLD AUTO: 0.12 K/UL — SIGNIFICANT CHANGE UP (ref 0–0.5)
EOSINOPHIL NFR BLD AUTO: 1.3 % — SIGNIFICANT CHANGE UP (ref 0–6)
HCT VFR BLD CALC: 36.1 % — SIGNIFICANT CHANGE UP (ref 34.5–45)
HGB BLD-MCNC: 11.9 G/DL — SIGNIFICANT CHANGE UP (ref 11.5–15.5)
IMM GRANULOCYTES NFR BLD AUTO: 0.6 % — SIGNIFICANT CHANGE UP (ref 0–1.5)
LYMPHOCYTES # BLD AUTO: 1.19 K/UL — SIGNIFICANT CHANGE UP (ref 1–3.3)
LYMPHOCYTES # BLD AUTO: 13.1 % — SIGNIFICANT CHANGE UP (ref 13–44)
MCHC RBC-ENTMCNC: 29.5 PG — SIGNIFICANT CHANGE UP (ref 27–34)
MCHC RBC-ENTMCNC: 33 GM/DL — SIGNIFICANT CHANGE UP (ref 32–36)
MCV RBC AUTO: 89.6 FL — SIGNIFICANT CHANGE UP (ref 80–100)
MONOCYTES # BLD AUTO: 0.61 K/UL — SIGNIFICANT CHANGE UP (ref 0–0.9)
MONOCYTES NFR BLD AUTO: 6.7 % — SIGNIFICANT CHANGE UP (ref 2–14)
NEUTROPHILS # BLD AUTO: 7.08 K/UL — SIGNIFICANT CHANGE UP (ref 1.8–7.4)
NEUTROPHILS NFR BLD AUTO: 78.1 % — HIGH (ref 43–77)
NRBC # BLD: 0 /100 WBCS — SIGNIFICANT CHANGE UP (ref 0–0)
PLATELET # BLD AUTO: 169 K/UL — SIGNIFICANT CHANGE UP (ref 150–400)
RBC # BLD: 4.03 M/UL — SIGNIFICANT CHANGE UP (ref 3.8–5.2)
RBC # FLD: 13.2 % — SIGNIFICANT CHANGE UP (ref 10.3–14.5)
RH IG SCN BLD-IMP: NEGATIVE — SIGNIFICANT CHANGE UP
RH IG SCN BLD-IMP: NEGATIVE — SIGNIFICANT CHANGE UP
SARS-COV-2 RNA SPEC QL NAA+PROBE: SIGNIFICANT CHANGE UP
WBC # BLD: 9.07 K/UL — SIGNIFICANT CHANGE UP (ref 3.8–10.5)
WBC # FLD AUTO: 9.07 K/UL — SIGNIFICANT CHANGE UP (ref 3.8–10.5)

## 2020-07-06 PROCEDURE — 59510 CESAREAN DELIVERY: CPT

## 2020-07-06 PROCEDURE — 88307 TISSUE EXAM BY PATHOLOGIST: CPT | Mod: 26

## 2020-07-06 RX ORDER — ONDANSETRON 8 MG/1
4 TABLET, FILM COATED ORAL EVERY 6 HOURS
Refills: 0 | Status: DISCONTINUED | OUTPATIENT
Start: 2020-07-06 | End: 2020-07-06

## 2020-07-06 RX ORDER — AMPICILLIN TRIHYDRATE 250 MG
CAPSULE ORAL
Refills: 0 | Status: DISCONTINUED | OUTPATIENT
Start: 2020-07-06 | End: 2020-07-06

## 2020-07-06 RX ORDER — CITRIC ACID/SODIUM CITRATE 300-500 MG
30 SOLUTION, ORAL ORAL ONCE
Refills: 0 | Status: COMPLETED | OUTPATIENT
Start: 2020-07-06 | End: 2020-07-06

## 2020-07-06 RX ORDER — OXYTOCIN 10 UNIT/ML
333.33 VIAL (ML) INJECTION
Qty: 20 | Refills: 0 | Status: DISCONTINUED | OUTPATIENT
Start: 2020-07-06 | End: 2020-07-06

## 2020-07-06 RX ORDER — OXYTOCIN 10 UNIT/ML
2 VIAL (ML) INJECTION
Qty: 30 | Refills: 0 | Status: DISCONTINUED | OUTPATIENT
Start: 2020-07-06 | End: 2020-07-06

## 2020-07-06 RX ORDER — KETOROLAC TROMETHAMINE 30 MG/ML
30 SYRINGE (ML) INJECTION EVERY 6 HOURS
Refills: 0 | Status: DISCONTINUED | OUTPATIENT
Start: 2020-07-06 | End: 2020-07-07

## 2020-07-06 RX ORDER — OXYTOCIN 10 UNIT/ML
6 VIAL (ML) INJECTION
Qty: 30 | Refills: 0 | Status: DISCONTINUED | OUTPATIENT
Start: 2020-07-06 | End: 2020-07-06

## 2020-07-06 RX ORDER — CITRIC ACID/SODIUM CITRATE 300-500 MG
15 SOLUTION, ORAL ORAL EVERY 6 HOURS
Refills: 0 | Status: DISCONTINUED | OUTPATIENT
Start: 2020-07-06 | End: 2020-07-06

## 2020-07-06 RX ORDER — ENOXAPARIN SODIUM 100 MG/ML
40 INJECTION SUBCUTANEOUS EVERY 24 HOURS
Refills: 0 | Status: DISCONTINUED | OUTPATIENT
Start: 2020-07-07 | End: 2020-07-08

## 2020-07-06 RX ORDER — CHLORHEXIDINE GLUCONATE 213 G/1000ML
1 SOLUTION TOPICAL DAILY
Refills: 0 | Status: DISCONTINUED | OUTPATIENT
Start: 2020-07-06 | End: 2020-07-06

## 2020-07-06 RX ORDER — AMPICILLIN TRIHYDRATE 250 MG
2 CAPSULE ORAL ONCE
Refills: 0 | Status: DISCONTINUED | OUTPATIENT
Start: 2020-07-06 | End: 2020-07-06

## 2020-07-06 RX ORDER — NALOXONE HYDROCHLORIDE 4 MG/.1ML
0.1 SPRAY NASAL
Refills: 0 | Status: DISCONTINUED | OUTPATIENT
Start: 2020-07-06 | End: 2020-07-06

## 2020-07-06 RX ORDER — OXYTOCIN 10 UNIT/ML
333.33 VIAL (ML) INJECTION
Qty: 20 | Refills: 0 | Status: DISCONTINUED | OUTPATIENT
Start: 2020-07-06 | End: 2020-07-08

## 2020-07-06 RX ORDER — IBUPROFEN 200 MG
600 TABLET ORAL EVERY 6 HOURS
Refills: 0 | Status: COMPLETED | OUTPATIENT
Start: 2020-07-06 | End: 2021-06-04

## 2020-07-06 RX ORDER — SODIUM CHLORIDE 9 MG/ML
1000 INJECTION, SOLUTION INTRAVENOUS
Refills: 0 | Status: DISCONTINUED | OUTPATIENT
Start: 2020-07-06 | End: 2020-07-06

## 2020-07-06 RX ORDER — GENTAMICIN SULFATE 40 MG/ML
240 VIAL (ML) INJECTION ONCE
Refills: 0 | Status: COMPLETED | OUTPATIENT
Start: 2020-07-06 | End: 2020-07-06

## 2020-07-06 RX ORDER — OXYCODONE HYDROCHLORIDE 5 MG/1
5 TABLET ORAL ONCE
Refills: 0 | Status: DISCONTINUED | OUTPATIENT
Start: 2020-07-06 | End: 2020-07-08

## 2020-07-06 RX ORDER — ACETAMINOPHEN 500 MG
975 TABLET ORAL
Refills: 0 | Status: DISCONTINUED | OUTPATIENT
Start: 2020-07-06 | End: 2020-07-08

## 2020-07-06 RX ORDER — AMPICILLIN TRIHYDRATE 250 MG
2 CAPSULE ORAL EVERY 6 HOURS
Refills: 0 | Status: COMPLETED | OUTPATIENT
Start: 2020-07-06 | End: 2020-07-07

## 2020-07-06 RX ORDER — MORPHINE SULFATE 50 MG/1
4 CAPSULE, EXTENDED RELEASE ORAL ONCE
Refills: 0 | Status: DISCONTINUED | OUTPATIENT
Start: 2020-07-06 | End: 2020-07-06

## 2020-07-06 RX ORDER — SODIUM CHLORIDE 9 MG/ML
1000 INJECTION, SOLUTION INTRAVENOUS
Refills: 0 | Status: DISCONTINUED | OUTPATIENT
Start: 2020-07-06 | End: 2020-07-08

## 2020-07-06 RX ORDER — OXYCODONE HYDROCHLORIDE 5 MG/1
5 TABLET ORAL
Refills: 0 | Status: DISCONTINUED | OUTPATIENT
Start: 2020-07-06 | End: 2020-07-08

## 2020-07-06 RX ORDER — FENTANYL/BUPIVACAINE/NS/PF 2MCG/ML-.1
250 PLASTIC BAG, INJECTION (ML) INJECTION
Refills: 0 | Status: DISCONTINUED | OUTPATIENT
Start: 2020-07-06 | End: 2020-07-06

## 2020-07-06 RX ORDER — TETANUS TOXOID, REDUCED DIPHTHERIA TOXOID AND ACELLULAR PERTUSSIS VACCINE, ADSORBED 5; 2.5; 8; 8; 2.5 [IU]/.5ML; [IU]/.5ML; UG/.5ML; UG/.5ML; UG/.5ML
0.5 SUSPENSION INTRAMUSCULAR ONCE
Refills: 0 | Status: DISCONTINUED | OUTPATIENT
Start: 2020-07-06 | End: 2020-07-08

## 2020-07-06 RX ORDER — MAGNESIUM HYDROXIDE 400 MG/1
30 TABLET, CHEWABLE ORAL
Refills: 0 | Status: DISCONTINUED | OUTPATIENT
Start: 2020-07-06 | End: 2020-07-08

## 2020-07-06 RX ORDER — AZITHROMYCIN 500 MG/1
500 TABLET, FILM COATED ORAL ONCE
Refills: 0 | Status: COMPLETED | OUTPATIENT
Start: 2020-07-06 | End: 2020-07-06

## 2020-07-06 RX ORDER — ACETAMINOPHEN 500 MG
1000 TABLET ORAL ONCE
Refills: 0 | Status: COMPLETED | OUTPATIENT
Start: 2020-07-06 | End: 2020-07-06

## 2020-07-06 RX ORDER — LANOLIN
1 OINTMENT (GRAM) TOPICAL EVERY 6 HOURS
Refills: 0 | Status: DISCONTINUED | OUTPATIENT
Start: 2020-07-06 | End: 2020-07-08

## 2020-07-06 RX ORDER — DIPHENHYDRAMINE HCL 50 MG
25 CAPSULE ORAL EVERY 6 HOURS
Refills: 0 | Status: DISCONTINUED | OUTPATIENT
Start: 2020-07-06 | End: 2020-07-08

## 2020-07-06 RX ORDER — SIMETHICONE 80 MG/1
80 TABLET, CHEWABLE ORAL EVERY 4 HOURS
Refills: 0 | Status: DISCONTINUED | OUTPATIENT
Start: 2020-07-06 | End: 2020-07-08

## 2020-07-06 RX ORDER — CEFAZOLIN SODIUM 1 G
2000 VIAL (EA) INJECTION ONCE
Refills: 0 | Status: COMPLETED | OUTPATIENT
Start: 2020-07-06 | End: 2020-07-06

## 2020-07-06 RX ADMIN — SODIUM CHLORIDE 125 MILLILITER(S): 9 INJECTION, SOLUTION INTRAVENOUS at 09:39

## 2020-07-06 RX ADMIN — Medication 200 MILLIGRAM(S): at 23:20

## 2020-07-06 RX ADMIN — Medication 2 MILLIUNIT(S)/MIN: at 14:52

## 2020-07-06 RX ADMIN — Medication 100 MILLIGRAM(S): at 20:22

## 2020-07-06 RX ADMIN — AZITHROMYCIN 255 MILLIGRAM(S): 500 TABLET, FILM COATED ORAL at 20:45

## 2020-07-06 RX ADMIN — Medication 0.25 MILLIGRAM(S): at 12:18

## 2020-07-06 RX ADMIN — Medication 30 MILLILITER(S): at 20:18

## 2020-07-06 RX ADMIN — CHLORHEXIDINE GLUCONATE 1 APPLICATION(S): 213 SOLUTION TOPICAL at 20:15

## 2020-07-06 RX ADMIN — Medication 216 GRAM(S): at 23:42

## 2020-07-06 RX ADMIN — Medication 400 MILLIGRAM(S): at 22:22

## 2020-07-06 RX ADMIN — Medication 6 MILLIUNIT(S)/MIN: at 12:05

## 2020-07-06 RX ADMIN — Medication 100 MILLIGRAM(S): at 22:46

## 2020-07-06 RX ADMIN — Medication 250 MILLILITER(S): at 13:33

## 2020-07-07 LAB
BASOPHILS # BLD AUTO: 0.02 K/UL — SIGNIFICANT CHANGE UP (ref 0–0.2)
BASOPHILS NFR BLD AUTO: 0.1 % — SIGNIFICANT CHANGE UP (ref 0–2)
EOSINOPHIL # BLD AUTO: 0.02 K/UL — SIGNIFICANT CHANGE UP (ref 0–0.5)
EOSINOPHIL NFR BLD AUTO: 0.1 % — SIGNIFICANT CHANGE UP (ref 0–6)
GRAM STN FLD: SIGNIFICANT CHANGE UP
GRAM STN FLD: SIGNIFICANT CHANGE UP
HCT VFR BLD CALC: 29.4 % — LOW (ref 34.5–45)
HGB BLD-MCNC: 9.4 G/DL — LOW (ref 11.5–15.5)
IMM GRANULOCYTES NFR BLD AUTO: 0.5 % — SIGNIFICANT CHANGE UP (ref 0–1.5)
LYMPHOCYTES # BLD AUTO: 1.69 K/UL — SIGNIFICANT CHANGE UP (ref 1–3.3)
LYMPHOCYTES # BLD AUTO: 11 % — LOW (ref 13–44)
MCHC RBC-ENTMCNC: 29.5 PG — SIGNIFICANT CHANGE UP (ref 27–34)
MCHC RBC-ENTMCNC: 32 GM/DL — SIGNIFICANT CHANGE UP (ref 32–36)
MCV RBC AUTO: 92.2 FL — SIGNIFICANT CHANGE UP (ref 80–100)
MONOCYTES # BLD AUTO: 0.89 K/UL — SIGNIFICANT CHANGE UP (ref 0–0.9)
MONOCYTES NFR BLD AUTO: 5.8 % — SIGNIFICANT CHANGE UP (ref 2–14)
NEUTROPHILS # BLD AUTO: 12.68 K/UL — HIGH (ref 1.8–7.4)
NEUTROPHILS NFR BLD AUTO: 82.5 % — HIGH (ref 43–77)
NRBC # BLD: 0 /100 WBCS — SIGNIFICANT CHANGE UP (ref 0–0)
PLATELET # BLD AUTO: 143 K/UL — LOW (ref 150–400)
RBC # BLD: 3.19 M/UL — LOW (ref 3.8–5.2)
RBC # FLD: 13.2 % — SIGNIFICANT CHANGE UP (ref 10.3–14.5)
RH IG SCN BLD-IMP: NEGATIVE — SIGNIFICANT CHANGE UP
SARS-COV-2 IGG SERPL QL IA: NEGATIVE — SIGNIFICANT CHANGE UP
SARS-COV-2 IGM SERPL IA-ACNC: <0.1 INDEX — SIGNIFICANT CHANGE UP
SPECIMEN SOURCE: SIGNIFICANT CHANGE UP
SPECIMEN SOURCE: SIGNIFICANT CHANGE UP
T PALLIDUM AB TITR SER: NEGATIVE — SIGNIFICANT CHANGE UP
WBC # BLD: 15.37 K/UL — HIGH (ref 3.8–10.5)
WBC # FLD AUTO: 15.37 K/UL — HIGH (ref 3.8–10.5)

## 2020-07-07 RX ORDER — IBUPROFEN 200 MG
600 TABLET ORAL EVERY 6 HOURS
Refills: 0 | Status: DISCONTINUED | OUTPATIENT
Start: 2020-07-07 | End: 2020-07-08

## 2020-07-07 RX ADMIN — Medication 30 MILLIGRAM(S): at 12:30

## 2020-07-07 RX ADMIN — ENOXAPARIN SODIUM 40 MILLIGRAM(S): 100 INJECTION SUBCUTANEOUS at 10:36

## 2020-07-07 RX ADMIN — Medication 216 GRAM(S): at 18:30

## 2020-07-07 RX ADMIN — Medication 975 MILLIGRAM(S): at 09:49

## 2020-07-07 RX ADMIN — Medication 975 MILLIGRAM(S): at 03:28

## 2020-07-07 RX ADMIN — Medication 216 GRAM(S): at 12:32

## 2020-07-07 RX ADMIN — Medication 100 MILLIGRAM(S): at 07:15

## 2020-07-07 RX ADMIN — Medication 30 MILLIGRAM(S): at 06:43

## 2020-07-07 RX ADMIN — Medication 975 MILLIGRAM(S): at 20:41

## 2020-07-07 RX ADMIN — Medication 25 MILLIGRAM(S): at 07:13

## 2020-07-07 RX ADMIN — Medication 100 MILLIGRAM(S): at 14:22

## 2020-07-07 RX ADMIN — Medication 30 MILLIGRAM(S): at 18:27

## 2020-07-07 RX ADMIN — Medication 600 MILLIGRAM(S): at 23:41

## 2020-07-07 RX ADMIN — Medication 216 GRAM(S): at 05:54

## 2020-07-07 RX ADMIN — Medication 30 MILLIGRAM(S): at 01:02

## 2020-07-07 RX ADMIN — Medication 975 MILLIGRAM(S): at 15:14

## 2020-07-07 NOTE — PROGRESS NOTE ADULT - ASSESSMENT
A/P   29y  s/p  section, POD #1, stable  -  Pain: PO motrin and tylenol, oxycodone PRN  -Chorio: abx 24h, afebrile, f/u placental cx  -  Post-operatively labs: post-op Hgb , hemodynamically stable, no symptoms of anemia   -  GI: tolerating clears, passing gas, ADAT  -  : odell in situ; DC this AM, f/u TOV  -  DVT prophylaxis: ambulation, SCDs, lovenox  -  Dispo: POD 3 or 4

## 2020-07-07 NOTE — LACTATION INITIAL EVALUATION - LACTATION INTERVENTIONS
initiate skin to skin/Demonstrated hand expression but unable to obtain colostrum/initiate hand expression routine

## 2020-07-07 NOTE — PROGRESS NOTE ADULT - ASSESSMENT
A/P  29y  s/p  section c/b maternal fever x 1. Now POD #1, hemodynamically stable  -  Pain: PO motrin and tylenol, oxycodone PRN  -  Chorio: abx 24h, afebrile, f/u placental cx  -  Hemodynamically stable, no symptoms of anemia   -  GI: tolerating clears, passing gas  -  : cass DC-ed,  -  DVT prophylaxis: ambulation, lovenox  -  Dispo: POD 3 or 4

## 2020-07-07 NOTE — LACTATION INITIAL EVALUATION - NS LACT CON REASON FOR REQ
pt. is a p1 at 39.4 wks. gestation via primary .  Pt. denies medical problems during the pregnancy.  Pt. chose to breast and formula feed baby.  Baby had received formula x4.  Baby is 17 hrs. old has had 3 stools, due to void.  Reviewed with pt. alignment of baby to pt.,  aligning baby's nose to pt.'s nipple, encouraging baby to open mouth wide and then attempt to latch.  Discussed with pt. if she wants her breast milk to come in encouraged pt. to offer breast before supplementing with formula.  Attempted to latch baby to breast and baby unable to sustain latch at this time.  Discussed with pt. importance of providing consistent stimulation to breasts to encourage milk supply.  Did provide instruction for use of pump due to baby's inability to sustain latch and difficulty expressing colostrum.   Discussed with pt. amounts of formula baby to receive according to hours of life.  nurse made aware of baby's inability to sustain latch at this time pt. can continue to supplement with formula, since it is also difficult to express colostrum./primaparous mom primaparous mom/Pt. is a p1 at 39.4 wks. gestation via primary .  Pt. denies medical problems during the pregnancy.  Pt. chose to breast and formula feed baby.  Wanted baby to obtain colostrum if possible.  Baby had received formula x4 with no attempts at breastfeed.  Baby is 17 hrs. old has had 3 stools, due to void.  Reviewed with pt. alignment of baby to pt.,  aligning baby's nose to pt.'s nipple, encouraging baby to open mouth wide and then attempt to latch.  Discussed with pt. if she wants her breast milk to come in encouraged pt. to offer breast before supplementing with formula.  Attempted to latch baby to breast and baby unable to sustain latch at this time.    Demonstrated hand expression but unable to express colostrum.  Small drops of colostrum noted at nipple piercing site but does not come out.  Pt. instructed on  use of hand pump due to baby less than 24 hrs. old.   Unable to collect colsotrum also unable to joey nipple due to nipple being flat.

## 2020-07-08 ENCOUNTER — TRANSCRIPTION ENCOUNTER (OUTPATIENT)
Age: 30
End: 2020-07-08

## 2020-07-08 VITALS
HEART RATE: 83 BPM | OXYGEN SATURATION: 99 % | DIASTOLIC BLOOD PRESSURE: 63 MMHG | SYSTOLIC BLOOD PRESSURE: 104 MMHG | RESPIRATION RATE: 18 BRPM | TEMPERATURE: 98 F

## 2020-07-08 PROCEDURE — 86780 TREPONEMA PALLIDUM: CPT

## 2020-07-08 PROCEDURE — 87070 CULTURE OTHR SPECIMN AEROBIC: CPT

## 2020-07-08 PROCEDURE — 87635 SARS-COV-2 COVID-19 AMP PRB: CPT

## 2020-07-08 PROCEDURE — 36415 COLL VENOUS BLD VENIPUNCTURE: CPT

## 2020-07-08 PROCEDURE — 88307 TISSUE EXAM BY PATHOLOGIST: CPT

## 2020-07-08 PROCEDURE — 86901 BLOOD TYPING SEROLOGIC RH(D): CPT

## 2020-07-08 PROCEDURE — 85025 COMPLETE CBC W/AUTO DIFF WBC: CPT

## 2020-07-08 PROCEDURE — 86769 SARS-COV-2 COVID-19 ANTIBODY: CPT

## 2020-07-08 PROCEDURE — 86850 RBC ANTIBODY SCREEN: CPT

## 2020-07-08 RX ORDER — ACETAMINOPHEN 500 MG
3 TABLET ORAL
Qty: 0 | Refills: 0 | DISCHARGE
Start: 2020-07-08

## 2020-07-08 RX ORDER — IBUPROFEN 200 MG
1 TABLET ORAL
Qty: 0 | Refills: 0 | DISCHARGE
Start: 2020-07-08

## 2020-07-08 NOTE — DISCHARGE NOTE OB - PATIENT PORTAL LINK FT
You can access the FollowMyHealth Patient Portal offered by [facility] by registering at the following website: [email or web address]. By joining [employer]’s FollowMyHealth portal, you will also be able to view your health information using other applications (apps) compatible with our system.

## 2020-07-08 NOTE — DISCHARGE NOTE OB - MEDICATION SUMMARY - MEDICATIONS TO STOP TAKING
I will STOP taking the medications listed below when I get home from the hospital:    azithromycin 500 mg oral tablet  -- 1 tab(s) by mouth once a day   -- Do not take dairy products, antacids, or iron preparations within one hour of this medication.  Finish all this medication unless otherwise directed by prescriber.

## 2020-07-08 NOTE — DISCHARGE NOTE OB - CARE PROVIDER_API CALL
Juve Liu)  OBGYN  [address], [facility] [address]  [place] [id number]  Phone: [phone]  Fax: [phone]  Follow Up Time:

## 2020-07-08 NOTE — PROGRESS NOTE ADULT - SUBJECTIVE AND OBJECTIVE BOX
Patient evaluated at bedside this morning, resting comfortable in bed, with no acute events overnight.  She reports pain is well controlled with toradol and tylenol.  She denies headache, dizziness, chest pain, palpitations, shortness of breath, nausea, vomiting or heavy vaginal bleeding.  She has not tried ambulating since procedure, odell remains in place at this time. Tolerating clear liquids.     Physical Exam:  Vital Signs Last 24 Hrs  T(C): 37.2 (07 Jul 2020 01:00), Max: 38.5 (06 Jul 2020 21:57)  T(F): 99 (07 Jul 2020 01:00), Max: 101.3 (06 Jul 2020 21:57)  HR: 79 (07 Jul 2020 01:00) (74 - 108)  BP: 103/66 (07 Jul 2020 01:00) (96/54 - 127/69)  BP(mean): --  RR: 20 (07 Jul 2020 01:00) (20 - 20)  SpO2: 98% (07 Jul 2020 01:00) (97% - 100%)    GA: NAD, A+0 x 3  CV: RRR, no murmurs/rubs  Pulm: CTAB, no wheezes/rhonchi  Breasts: soft, nontender, no palpable masses  Abd: + BS, soft, nontender, nondistended, no rebound or guarding, incision clean, dry and intact, uterus firm at midline and below umbilicus  : odell in situ, lochia WNL  Extremities: no swelling or calf tenderness, reflexes +2 bilaterally, SCD in place                            11.9   9.07  )-----------( 169      ( 06 Jul 2020 09:38 )             36.1               acetaminophen   Tablet .. 975 milliGRAM(s) Oral <User Schedule>  ampicillin  IVPB 2 Gram(s) IV Intermittent every 6 hours  clindamycin IVPB 900 milliGRAM(s) IV Intermittent every 8 hours  diphenhydrAMINE 25 milliGRAM(s) Oral every 6 hours PRN  diphtheria/tetanus/pertussis (acellular) Vaccine (ADAcel) 0.5 milliLiter(s) IntraMuscular once  enoxaparin Injectable 40 milliGRAM(s) SubCutaneous every 24 hours  ibuprofen  Tablet. 600 milliGRAM(s) Oral every 6 hours  ketorolac   Injectable 30 milliGRAM(s) IV Push every 6 hours  lactated ringers. 1000 milliLiter(s) IV Continuous <Continuous>  lanolin Ointment 1 Application(s) Topical every 6 hours PRN  magnesium hydroxide Suspension 30 milliLiter(s) Oral two times a day PRN  oxyCODONE    IR 5 milliGRAM(s) Oral every 3 hours PRN  oxyCODONE    IR 5 milliGRAM(s) Oral once PRN  oxytocin Infusion 333.333 milliUNIT(s)/Min IV Continuous <Continuous>  simethicone 80 milliGRAM(s) Chew every 4 hours PRN
Patient evaluated at bedside this afternoon, resting comfortable in bed.  She reports pain is well controlled. She denies headache, dizziness, chest pain, palpitations, shortness of breath, nausea, vomiting or heavy vaginal bleeding. Odell removed, voiding without difficulty, tolerating clear liquids, passing gas.    Physical Exam:  Vital Signs Last 24 Hrs  T(C): 37.2 (07 Jul 2020 01:00), Max: 38.5 (06 Jul 2020 21:57)  T(F): 99 (07 Jul 2020 01:00), Max: 101.3 (06 Jul 2020 21:57): afebrile today  HR: 79 (07 Jul 2020 01:00) (74 - 108)  BP: 103/66 (07 Jul 2020 01:00) (96/54 - 127/69)  RR: 20 (07 Jul 2020 01:00) (20 - 20)  SpO2: 98% (07 Jul 2020 01:00) (97% - 100%)    GA: NAD, A&0 x 3  CV: RRR,  Pulm: no increased WOB  Abd: soft, nontender, nondistended, no rebound or guarding, incision clean, dry and intact  : odell removed, lochia WNL  Extremities: no swelling or calf tenderness                        11.9   9.07  )-----------( 169      ( 06 Jul 2020 09:38 )             36.1
Patient evaluated at bedside this morning, resting comfortable in bed.   She reports pain is well controlled with motrin and tylenol.  She denies headache, dizziness, chest pain, palpitations, shortness of breath, nausea, vomiting or heavy vaginal bleeding.  She has been ambulating without assistance, voiding spontaneously, passing gas, tolerating regular diet and is breastfeeding.    Physical Exam:  Vital Signs Last 24 Hrs  T(C): 36.6 (07 Jul 2020 22:22), Max: 37.3 (07 Jul 2020 10:00)  T(F): 97.8 (07 Jul 2020 22:22), Max: 99.1 (07 Jul 2020 10:00)  HR: 85 (07 Jul 2020 22:22) (84 - 89)  BP: 104/74 (07 Jul 2020 22:22) (97/64 - 109/71)  BP(mean): --  RR: 17 (07 Jul 2020 22:22) (17 - 18)  SpO2: 98% (07 Jul 2020 22:22) (97% - 99%)    GA: NAD, A+0 x 3  CV: RRR  Pulm: CTAB  Breasts: soft, nontender, no palpable masses  Abd: + BS, soft, nontender, nondistended, no rebound or guarding, incision clean, dry and intact, uterus firm at midline and below umbilicus  : lochia WNL  Extremities: no swelling or calf tenderness                             9.4    15.37 )-----------( 143      ( 07 Jul 2020 06:57 )             29.4

## 2020-07-08 NOTE — PROGRESS NOTE ADULT - ASSESSMENT
A/P: 29y s/p  section, POD#2, stable  -  Pain: PO motrin q6hrs, tylenol q6hrs, gabapentin  -  Chorio: afebrile s/p abx for 24 hours  -  Post-operatively labs: post-op Hgb , hemodynamically stable, no symptoms of anemia   -  GI: tolerating regular diet, passing gas  -  : s/p odell , urinating without difficulty  -  DVT prophylaxis: encouraged increased ambulation, SCDs, lovenox  -  Dispo: POD 3 or 4 A/P: 29y s/p  section, POD#2, stable  -  Pain: PO motrin q6hrs, tylenol q6hrs  -  Chorio: afebrile s/p abx for 24 hours  -  Post-operatively labs: post-op Hgb , hemodynamically stable, no symptoms of anemia   -  GI: tolerating regular diet, passing gas  -  : s/p odell , urinating without difficulty  -  DVT prophylaxis: encouraged increased ambulation, SCDs, lovenox  -  Dispo: POD 3 or 4

## 2020-07-08 NOTE — DISCHARGE NOTE OB - MATERIALS PROVIDED
Birth Certificate Instructions/Back To Sleep Handout/Breastfeeding Mother’s Support Group Information/[facility]  Screening Program/Tdap Vaccination (VIS Pub Date: 2012)/Vaccinations/Breastfeeding Log/Breastfeeding Guide and Packet/[facility] Hearing Screen Program/Shaken Baby Prevention Handout/Bottle Feeding Log/Guide to Postpartum Care/  Immunization Record

## 2020-07-08 NOTE — DISCHARGE NOTE OB - CARE PLAN
Principal Discharge DX:	Postpartum state  Goal:	Happy and healthy mom and baby!  Secondary Diagnosis:	Chorioamnionitis in third trimester, single or unspecified fetus Principal Discharge DX:	Postpartum state  Goal:	Happy and healthy mom and baby!  Assessment and plan of treatment:	F/U with OB doctor in 1-2 wks.  Nothing in the Vagina for 6 weeks, no tampons, no sex for 6 weeks  If you have severe headaches and/or vision changes, heavy bleeding, CP or High fever please call your provider or go to the nearest ED  Secondary Diagnosis:	Chorioamnionitis in third trimester, single or unspecified fetus

## 2020-07-08 NOTE — DISCHARGE NOTE OB - HOSPITAL COURSE
Pt is a 29y s/p  section c/b maternal fever to 100.7 and is s/p A/G/C for 24h. Pt is currently afebrile and has been for >24hrs Pt is a 29y s/p  section c/b maternal fever to 100.7 and is s/p A/G/C for 24h. Pt is currently afebrile and has been for >24hrs.

## 2020-07-08 NOTE — DISCHARGE NOTE OB - PLAN OF CARE
Happy and healthy mom and baby! F/U with OB doctor in 1-2 wks.  Nothing in the Vagina for 6 weeks, no tampons, no sex for 6 weeks  If you have severe headaches and/or vision changes, heavy bleeding, CP or High fever please call your provider or go to the nearest ED

## 2020-07-09 ENCOUNTER — APPOINTMENT (OUTPATIENT)
Dept: ANTEPARTUM | Facility: CLINIC | Age: 30
End: 2020-07-09

## 2020-07-09 LAB — SURGICAL PATHOLOGY STUDY: SIGNIFICANT CHANGE UP

## 2020-07-10 ENCOUNTER — APPOINTMENT (OUTPATIENT)
Dept: OBGYN | Facility: CLINIC | Age: 30
End: 2020-07-10

## 2020-07-11 LAB
-  AMPICILLIN: SIGNIFICANT CHANGE UP
-  CLINDAMYCIN: SIGNIFICANT CHANGE UP
CULTURE RESULTS: NO GROWTH — SIGNIFICANT CHANGE UP
CULTURE RESULTS: SIGNIFICANT CHANGE UP
METHOD TYPE: SIGNIFICANT CHANGE UP
ORGANISM # SPEC MICROSCOPIC CNT: SIGNIFICANT CHANGE UP
ORGANISM # SPEC MICROSCOPIC CNT: SIGNIFICANT CHANGE UP
SPECIMEN SOURCE: SIGNIFICANT CHANGE UP
SPECIMEN SOURCE: SIGNIFICANT CHANGE UP

## 2020-07-14 DIAGNOSIS — Z67.31 TYPE AB BLOOD, RH NEGATIVE: ICD-10-CM

## 2020-07-14 DIAGNOSIS — Z3A.39 39 WEEKS GESTATION OF PREGNANCY: ICD-10-CM

## 2020-07-14 DIAGNOSIS — O99.89 OTHER SPECIFIED DISEASES AND CONDITIONS COMPLICATING PREGNANCY, CHILDBIRTH AND THE PUERPERIUM: ICD-10-CM

## 2020-07-14 DIAGNOSIS — A60.09 HERPESVIRAL INFECTION OF OTHER UROGENITAL TRACT: ICD-10-CM

## 2020-07-14 DIAGNOSIS — O26.893 OTHER SPECIFIED PREGNANCY RELATED CONDITIONS, THIRD TRIMESTER: ICD-10-CM

## 2020-07-14 DIAGNOSIS — Z91.013 ALLERGY TO SEAFOOD: ICD-10-CM

## 2020-07-14 DIAGNOSIS — Z91.048 OTHER NONMEDICINAL SUBSTANCE ALLERGY STATUS: ICD-10-CM

## 2020-07-14 DIAGNOSIS — N85.8 OTHER SPECIFIED NONINFLAMMATORY DISORDERS OF UTERUS: ICD-10-CM

## 2020-07-14 DIAGNOSIS — O41.1230 CHORIOAMNIONITIS, THIRD TRIMESTER, NOT APPLICABLE OR UNSPECIFIED: ICD-10-CM

## 2020-07-22 ENCOUNTER — APPOINTMENT (OUTPATIENT)
Dept: OBGYN | Facility: CLINIC | Age: 30
End: 2020-07-22
Payer: COMMERCIAL

## 2020-07-22 PROCEDURE — 0503F POSTPARTUM CARE VISIT: CPT

## 2020-07-22 NOTE — HISTORY OF PRESENT ILLNESS
[Postpartum Follow Up] : postpartum follow up [Delivery Date: ___] : on [unfilled] [Female] : Delivery History: baby girl [Wt. ___] : weighing [unfilled] [NICU: ___] : NICU: [unfilled] [Primary C/S] : delivered by  section [Complications:___] : no complications [Breastfeeding] : not currently nursing

## 2021-05-06 ENCOUNTER — EMERGENCY (EMERGENCY)
Facility: HOSPITAL | Age: 31
LOS: 1 days | Discharge: HOME | End: 2021-05-06
Admitting: EMERGENCY MEDICINE
Payer: COMMERCIAL

## 2021-05-06 VITALS
RESPIRATION RATE: 16 BRPM | OXYGEN SATURATION: 97 % | HEART RATE: 72 BPM | TEMPERATURE: 98 F | SYSTOLIC BLOOD PRESSURE: 105 MMHG | HEIGHT: 61 IN | DIASTOLIC BLOOD PRESSURE: 71 MMHG

## 2021-05-06 DIAGNOSIS — Z20.822 CONTACT WITH AND (SUSPECTED) EXPOSURE TO COVID-19: ICD-10-CM

## 2021-05-06 DIAGNOSIS — Z91.013 ALLERGY TO SEAFOOD: ICD-10-CM

## 2021-05-06 LAB — SARS-COV-2 RNA SPEC QL NAA+PROBE: SIGNIFICANT CHANGE UP

## 2021-05-06 PROCEDURE — U0005: CPT

## 2021-05-06 PROCEDURE — 99283 EMERGENCY DEPT VISIT LOW MDM: CPT

## 2021-05-06 PROCEDURE — U0003: CPT

## 2021-05-06 PROCEDURE — 99282 EMERGENCY DEPT VISIT SF MDM: CPT

## 2021-05-06 NOTE — ED PROVIDER NOTE - PATIENT PORTAL LINK FT
You can access the FollowMyHealth Patient Portal offered by [facility] by registering at the following website: [email or web address]. By joining [employer]’s FollowMyHealth portal, you will also be able to view your health information using other applications (apps) compatible with our system.

## 2021-05-06 NOTE — ED PROVIDER NOTE - IV ALTEPLASE EXCL ABS HIDDEN
Pt has upcoming lab appointment on 12/14/2020. Needs future orders place. Advised to schedule an appointment with you.  Jackie Anton CMA    Has appointment with Chantel Swain 12/14/2020. Please disregard.  Jackie Anton CMA    
show

## 2022-04-25 ENCOUNTER — APPOINTMENT (OUTPATIENT)
Dept: INTERNAL MEDICINE | Facility: CLINIC | Age: 32
End: 2022-04-25
Payer: COMMERCIAL

## 2022-04-25 VITALS
HEART RATE: 75 BPM | HEIGHT: 61 IN | BODY MASS INDEX: 26.24 KG/M2 | DIASTOLIC BLOOD PRESSURE: 58 MMHG | SYSTOLIC BLOOD PRESSURE: 102 MMHG | WEIGHT: 139 LBS | OXYGEN SATURATION: 98 % | TEMPERATURE: 97.4 F

## 2022-04-25 DIAGNOSIS — J30.81 ALLERGIC RHINITIS DUE TO ANIMAL (CAT) (DOG) HAIR AND DANDER: ICD-10-CM

## 2022-04-25 DIAGNOSIS — Z83.3 FAMILY HISTORY OF DIABETES MELLITUS: ICD-10-CM

## 2022-04-25 DIAGNOSIS — Z00.00 ENCOUNTER FOR GENERAL ADULT MEDICAL EXAMINATION W/OUT ABNORMAL FINDINGS: ICD-10-CM

## 2022-04-25 DIAGNOSIS — Z87.898 PERSONAL HISTORY OF OTHER SPECIFIED CONDITIONS: ICD-10-CM

## 2022-04-25 DIAGNOSIS — R50.9 FEVER, UNSPECIFIED: ICD-10-CM

## 2022-04-25 DIAGNOSIS — J45.20 MILD INTERMITTENT ASTHMA, UNCOMPLICATED: ICD-10-CM

## 2022-04-25 DIAGNOSIS — Z34.90 ENCOUNTER FOR SUPERVISION OF NORMAL PREGNANCY, UNSPECIFIED, UNSPECIFIED TRIMESTER: ICD-10-CM

## 2022-04-25 PROCEDURE — 99203 OFFICE O/P NEW LOW 30 MIN: CPT | Mod: 25

## 2022-04-25 RX ORDER — AZELASTINE HYDROCHLORIDE 137 UG/1
0.1 SPRAY, METERED NASAL TWICE DAILY
Qty: 1 | Refills: 3 | Status: ACTIVE | COMMUNITY
Start: 2022-04-25 | End: 1900-01-01

## 2022-04-25 RX ORDER — FLUTICASONE PROPIONATE 50 UG/1
50 SPRAY, METERED NASAL
Qty: 1 | Refills: 2 | Status: ACTIVE | COMMUNITY
Start: 2022-04-25 | End: 1900-01-01

## 2022-04-25 RX ORDER — HUMAN RHO(D) IMMUNE GLOBULIN 300 UG/1
1500 INJECTION, SOLUTION INTRAMUSCULAR
Qty: 1 | Refills: 0 | Status: DISCONTINUED | COMMUNITY
Start: 2020-03-13 | End: 2022-04-25

## 2022-04-25 RX ORDER — FERROUS SULFATE TAB 325 MG (65 MG ELEMENTAL FE) 325 (65 FE) MG
325 (65 FE) TAB ORAL 3 TIMES DAILY
Qty: 90 | Refills: 0 | Status: DISCONTINUED | COMMUNITY
Start: 2020-04-30 | End: 2022-04-25

## 2022-04-25 RX ORDER — ALBUTEROL SULFATE 90 UG/1
108 (90 BASE) INHALANT RESPIRATORY (INHALATION)
Qty: 1 | Refills: 2 | Status: ACTIVE | COMMUNITY
Start: 2022-04-25 | End: 1900-01-01

## 2022-04-25 NOTE — PHYSICAL EXAM
[No Edema] : there was no peripheral edema [No Joint Swelling] : no joint swelling [Normal] : affect was normal and insight and judgment were intact

## 2022-04-26 LAB
ALBUMIN SERPL ELPH-MCNC: 4.4 G/DL
ALP BLD-CCNC: 61 U/L
ALT SERPL-CCNC: 11 U/L
ANION GAP SERPL CALC-SCNC: 13 MMOL/L
AST SERPL-CCNC: 14 U/L
BASOPHILS # BLD AUTO: 0.02 K/UL
BASOPHILS NFR BLD AUTO: 0.3 %
BILIRUB SERPL-MCNC: 0.3 MG/DL
BUN SERPL-MCNC: 11 MG/DL
C TRACH RRNA SPEC QL NAA+PROBE: NOT DETECTED
CALCIUM SERPL-MCNC: 9 MG/DL
CHLORIDE SERPL-SCNC: 106 MMOL/L
CHOLEST SERPL-MCNC: 206 MG/DL
CO2 SERPL-SCNC: 22 MMOL/L
CREAT SERPL-MCNC: 0.64 MG/DL
EGFR: 120 ML/MIN/1.73M2
EOSINOPHIL # BLD AUTO: 0.32 K/UL
EOSINOPHIL NFR BLD AUTO: 5.5 %
ESTIMATED AVERAGE GLUCOSE: 108 MG/DL
GLUCOSE SERPL-MCNC: 92 MG/DL
HBA1C MFR BLD HPLC: 5.4 %
HCT VFR BLD CALC: 41.2 %
HCV AB SER QL: NONREACTIVE
HCV S/CO RATIO: 0.17 S/CO
HDLC SERPL-MCNC: 66 MG/DL
HGB BLD-MCNC: 13.1 G/DL
HIV1+2 AB SPEC QL IA.RAPID: NONREACTIVE
IMM GRANULOCYTES NFR BLD AUTO: 0.2 %
LDLC SERPL CALC-MCNC: 124 MG/DL
LYMPHOCYTES # BLD AUTO: 1.46 K/UL
LYMPHOCYTES NFR BLD AUTO: 25 %
MAN DIFF?: NORMAL
MCHC RBC-ENTMCNC: 28.6 PG
MCHC RBC-ENTMCNC: 31.8 GM/DL
MCV RBC AUTO: 90 FL
MONOCYTES # BLD AUTO: 0.38 K/UL
MONOCYTES NFR BLD AUTO: 6.5 %
N GONORRHOEA RRNA SPEC QL NAA+PROBE: NOT DETECTED
NEUTROPHILS # BLD AUTO: 3.64 K/UL
NEUTROPHILS NFR BLD AUTO: 62.5 %
NONHDLC SERPL-MCNC: 139 MG/DL
PLATELET # BLD AUTO: 272 K/UL
POTASSIUM SERPL-SCNC: 4.5 MMOL/L
PROT SERPL-MCNC: 7.2 G/DL
RBC # BLD: 4.58 M/UL
RBC # FLD: 12.6 %
SODIUM SERPL-SCNC: 141 MMOL/L
SOURCE AMPLIFICATION: NORMAL
T PALLIDUM AB SER QL IA: NEGATIVE
TRIGL SERPL-MCNC: 77 MG/DL
WBC # FLD AUTO: 5.83 K/UL

## 2022-04-26 NOTE — REVIEW OF SYSTEMS
[Fever] : fever [Chills] : chills [Fatigue] : fatigue [Night Sweats] : night sweats [Nasal Discharge] : nasal discharge [Wheezing] : wheezing [Cough] : cough [Negative] : Heme/Lymph [Hearing Loss] : no hearing loss [Nosebleed] : no nosebleeds [Hoarseness] : no hoarseness [Sore Throat] : no sore throat [Postnasal Drip] : no postnasal drip [Shortness Of Breath] : no shortness of breath [Dyspnea on Exertion] : no dyspnea on exertion

## 2022-04-26 NOTE — HEALTH RISK ASSESSMENT
[Never] : Never [No] : In the past 12 months have you used drugs other than those required for medical reasons? No [0] : 2) Feeling down, depressed, or hopeless: Not at all (0) [PHQ-2 Negative - No further assessment needed] : PHQ-2 Negative - No further assessment needed [[id number]] : 0

## 2022-04-26 NOTE — HISTORY OF PRESENT ILLNESS
[de-identified] : Ms. GABINO ORTIZ is a 31 year old female with history of environmental allergies presenting today for CPE. Reports recent episodes of flu like illness\par \par #Illness\par - Fevers, chills, sweats, headaches and fatigue\par - lasting  2-3 days \par - happened twice in March, once in April\par - associated cough with yellow/green mucus first 2 episodes \par - had negative COVID test with all three episodes \par \par #Allergies\par - summer time allergies and dog trigger symptoms\par - itchy eyes, runny nose, rash\par - occasionally develops wheezing and cough\par - developed SOB while staying with relative who has multiple ~2 years ago

## 2022-04-26 NOTE — ASSESSMENT
[FreeTextEntry1] : #HCM\par - vaccines up to date via employee health\par - STI screening\par - follows with OB/GYN for PAP\par - discussed diet/exercise

## 2022-09-10 NOTE — LACTATION INITIAL EVALUATION - NIPPLE ASSESSMENT (RIGHT)
Pt care assumed, aox4.  Patient resting in bed. POC discussed with pt and verbalizes no questions. Pt c/o of 8/10 pain @ [facility]. Pt denies any additional needs at this time. Bed in lowest position, pt educated on fall risk and verbalized understanding, call light within reach, bed alarm plugged in and on.       had a nipple piercing/medium/compressible/flat

## 2023-06-06 ENCOUNTER — OFFICE (OUTPATIENT)
Dept: URBAN - METROPOLITAN AREA CLINIC 28 | Facility: CLINIC | Age: 33
Setting detail: OPHTHALMOLOGY
End: 2023-06-06
Payer: COMMERCIAL

## 2023-06-06 DIAGNOSIS — B30.9: ICD-10-CM

## 2023-06-06 PROCEDURE — 99203 OFFICE O/P NEW LOW 30 MIN: CPT | Performed by: OPHTHALMOLOGY

## 2023-06-10 PROBLEM — B30.9 VIRAL CONJUNCTIVITIS, UNSPECIFIED ; LEFT EYE: Status: ACTIVE | Noted: 2023-06-06

## 2023-06-10 ASSESSMENT — VISUAL ACUITY
OD_BCVA: 20/30+
OS_BCVA: 20/25

## 2024-02-15 ENCOUNTER — OFFICE (OUTPATIENT)
Dept: URBAN - METROPOLITAN AREA CLINIC 28 | Facility: CLINIC | Age: 34
Setting detail: OPHTHALMOLOGY
End: 2024-02-15
Payer: COMMERCIAL

## 2024-02-15 ENCOUNTER — RX ONLY (RX ONLY)
Age: 34
End: 2024-02-15

## 2024-02-15 DIAGNOSIS — B30.9: ICD-10-CM

## 2024-02-15 PROCEDURE — 99213 OFFICE O/P EST LOW 20 MIN: CPT | Performed by: OPHTHALMOLOGY

## 2024-02-15 ASSESSMENT — CONFRONTATIONAL VISUAL FIELD TEST (CVF)
OS_FINDINGS: FULL
OD_FINDINGS: FULL

## 2024-05-14 ENCOUNTER — APPOINTMENT (OUTPATIENT)
Dept: INTERNAL MEDICINE | Facility: CLINIC | Age: 34
End: 2024-05-14
Payer: COMMERCIAL

## 2024-05-14 VITALS
OXYGEN SATURATION: 98 % | WEIGHT: 152 LBS | SYSTOLIC BLOOD PRESSURE: 111 MMHG | TEMPERATURE: 98.1 F | HEART RATE: 74 BPM | BODY MASS INDEX: 28.7 KG/M2 | DIASTOLIC BLOOD PRESSURE: 69 MMHG | HEIGHT: 61 IN

## 2024-05-14 DIAGNOSIS — Z13.220 ENCOUNTER FOR SCREENING FOR LIPOID DISORDERS: ICD-10-CM

## 2024-05-14 DIAGNOSIS — J30.2 OTHER SEASONAL ALLERGIC RHINITIS: ICD-10-CM

## 2024-05-14 DIAGNOSIS — Z11.3 ENCOUNTER FOR SCREENING FOR INFECTIONS WITH A PREDOMINANTLY SEXUAL MODE OF TRANSMISSION: ICD-10-CM

## 2024-05-14 DIAGNOSIS — K59.09 OTHER CONSTIPATION: ICD-10-CM

## 2024-05-14 DIAGNOSIS — Z13.1 ENCOUNTER FOR SCREENING FOR DIABETES MELLITUS: ICD-10-CM

## 2024-05-14 PROCEDURE — 99214 OFFICE O/P EST MOD 30 MIN: CPT

## 2024-05-14 PROCEDURE — G2211 COMPLEX E/M VISIT ADD ON: CPT

## 2024-05-14 PROCEDURE — 36415 COLL VENOUS BLD VENIPUNCTURE: CPT

## 2024-05-14 NOTE — HISTORY OF PRESENT ILLNESS
[FreeTextEntry8] : Ms. GABINO ORTIZ is a 33-year-old female with history of environmental allergies presenting today for acute visit. She noticed a lump her neck that has since disappeared. She saw an ENT (Kelly Briscoe MD) yesterday and is planned to have allergy testing.

## 2024-05-20 ENCOUNTER — TRANSCRIPTION ENCOUNTER (OUTPATIENT)
Age: 34
End: 2024-05-20

## 2024-05-20 LAB
ALBUMIN SERPL ELPH-MCNC: 4.6 G/DL
ALP BLD-CCNC: 68 U/L
ALT SERPL-CCNC: 24 U/L
ANION GAP SERPL CALC-SCNC: 12 MMOL/L
AST SERPL-CCNC: 21 U/L
BASOPHILS # BLD AUTO: 0.02 K/UL
BASOPHILS NFR BLD AUTO: 0.3 %
BILIRUB SERPL-MCNC: 0.2 MG/DL
BUN SERPL-MCNC: 17 MG/DL
C TRACH RRNA SPEC QL NAA+PROBE: NOT DETECTED
CALCIUM SERPL-MCNC: 9.5 MG/DL
CHLORIDE SERPL-SCNC: 100 MMOL/L
CHOLEST SERPL-MCNC: 240 MG/DL
CO2 SERPL-SCNC: 23 MMOL/L
CREAT SERPL-MCNC: 1.01 MG/DL
EGFR: 74 ML/MIN/1.73M2
EOSINOPHIL # BLD AUTO: 0.61 K/UL
EOSINOPHIL NFR BLD AUTO: 7.9 %
ESTIMATED AVERAGE GLUCOSE: 103 MG/DL
GLUCOSE SERPL-MCNC: 96 MG/DL
HBA1C MFR BLD HPLC: 5.2 %
HCT VFR BLD CALC: 41.3 %
HCV AB SER QL: NONREACTIVE
HCV S/CO RATIO: 0.18 S/CO
HDLC SERPL-MCNC: 86 MG/DL
HGB BLD-MCNC: 13.6 G/DL
HIV1+2 AB SPEC QL IA.RAPID: NONREACTIVE
IMM GRANULOCYTES NFR BLD AUTO: 0.3 %
LDLC SERPL CALC-MCNC: 137 MG/DL
LYMPHOCYTES # BLD AUTO: 1.96 K/UL
LYMPHOCYTES NFR BLD AUTO: 25.3 %
MAN DIFF?: NORMAL
MCHC RBC-ENTMCNC: 28.5 PG
MCHC RBC-ENTMCNC: 32.9 GM/DL
MCV RBC AUTO: 86.6 FL
MONOCYTES # BLD AUTO: 0.51 K/UL
MONOCYTES NFR BLD AUTO: 6.6 %
N GONORRHOEA RRNA SPEC QL NAA+PROBE: NOT DETECTED
NEUTROPHILS # BLD AUTO: 4.63 K/UL
NEUTROPHILS NFR BLD AUTO: 59.6 %
NONHDLC SERPL-MCNC: 154 MG/DL
PLATELET # BLD AUTO: 273 K/UL
POTASSIUM SERPL-SCNC: 4.2 MMOL/L
PROT SERPL-MCNC: 7.4 G/DL
RBC # BLD: 4.77 M/UL
RBC # FLD: 12.5 %
SODIUM SERPL-SCNC: 134 MMOL/L
SOURCE AMPLIFICATION: NORMAL
T PALLIDUM AB SER QL IA: NEGATIVE
TRIGL SERPL-MCNC: 103 MG/DL
WBC # FLD AUTO: 7.75 K/UL

## 2025-05-04 NOTE — BEGINNING OF VISIT
[Patient] : patient 03-May-2025 12:00 03-May-2025 14:02 04-May-2025 02:09 03-May-2025 22:20 04-May-2025 06:46